# Patient Record
Sex: FEMALE | Race: WHITE | NOT HISPANIC OR LATINO | Employment: FULL TIME | URBAN - METROPOLITAN AREA
[De-identification: names, ages, dates, MRNs, and addresses within clinical notes are randomized per-mention and may not be internally consistent; named-entity substitution may affect disease eponyms.]

---

## 2017-02-28 ENCOUNTER — HISTORICAL (OUTPATIENT)
Dept: INTERNAL MEDICINE | Facility: CLINIC | Age: 52
End: 2017-02-28

## 2017-04-24 ENCOUNTER — HISTORICAL (OUTPATIENT)
Dept: INTERNAL MEDICINE | Facility: CLINIC | Age: 52
End: 2017-04-24

## 2017-06-02 ENCOUNTER — HISTORICAL (OUTPATIENT)
Dept: INTERNAL MEDICINE | Facility: CLINIC | Age: 52
End: 2017-06-02

## 2017-08-11 ENCOUNTER — HISTORICAL (OUTPATIENT)
Dept: RADIOLOGY | Facility: HOSPITAL | Age: 52
End: 2017-08-11

## 2017-09-06 ENCOUNTER — HISTORICAL (OUTPATIENT)
Dept: ADMINISTRATIVE | Facility: HOSPITAL | Age: 52
End: 2017-09-06

## 2017-10-30 ENCOUNTER — HISTORICAL (OUTPATIENT)
Dept: WOUND CARE | Facility: HOSPITAL | Age: 52
End: 2017-10-30

## 2017-10-30 LAB
APPEARANCE, UA: CLEAR
BACTERIA #/AREA URNS AUTO: ABNORMAL /[HPF]
BILIRUB UR QL STRIP: NEGATIVE
COLOR UR: YELLOW
GLUCOSE (UA): NORMAL
HGB UR QL STRIP: 0.03 MG/DL
HYALINE CASTS #/AREA URNS LPF: ABNORMAL /[LPF]
KETONES UR QL STRIP: NEGATIVE
LEUKOCYTE ESTERASE UR QL STRIP: NEGATIVE
NITRITE UR QL STRIP: NEGATIVE
PH UR STRIP: 6 [PH] (ref 4.5–8)
PROT UR QL STRIP: NEGATIVE
RBC #/AREA URNS AUTO: ABNORMAL /[HPF]
SP GR UR STRIP: 1.01 (ref 1–1.03)
SQUAMOUS #/AREA URNS LPF: ABNORMAL /[LPF]
UROBILINOGEN UR STRIP-ACNC: 2 MG/DL
WBC #/AREA URNS AUTO: ABNORMAL /HPF

## 2017-11-01 LAB — FINAL CULTURE: NO GROWTH

## 2017-12-04 ENCOUNTER — HISTORICAL (OUTPATIENT)
Dept: ADMINISTRATIVE | Facility: HOSPITAL | Age: 52
End: 2017-12-04

## 2017-12-04 LAB
APPEARANCE, UA: CLEAR
BACTERIA #/AREA URNS AUTO: ABNORMAL /[HPF]
BILIRUB UR QL STRIP: NEGATIVE
COLOR UR: ABNORMAL
GLUCOSE (UA): NORMAL
HGB UR QL STRIP: 0.06 MG/DL
HYALINE CASTS #/AREA URNS LPF: ABNORMAL /[LPF]
KETONES UR QL STRIP: NEGATIVE
LEUKOCYTE ESTERASE UR QL STRIP: 75 LEU/UL
NITRITE UR QL STRIP: NEGATIVE
PH UR STRIP: 5.5 [PH] (ref 4.5–8)
PROT UR QL STRIP: NEGATIVE
RBC #/AREA URNS AUTO: ABNORMAL /[HPF]
SP GR UR STRIP: 1.01 (ref 1–1.03)
SQUAMOUS #/AREA URNS LPF: ABNORMAL /[LPF]
UROBILINOGEN UR STRIP-ACNC: NORMAL
WBC #/AREA URNS AUTO: ABNORMAL /HPF

## 2017-12-06 LAB — FINAL CULTURE: NO GROWTH

## 2018-01-12 ENCOUNTER — HISTORICAL (OUTPATIENT)
Dept: RADIOLOGY | Facility: HOSPITAL | Age: 53
End: 2018-01-12

## 2018-02-05 LAB
BILIRUB SERPL-MCNC: NEGATIVE MG/DL
BLOOD URINE, POC: NORMAL
CLARITY, POC UA: CLEAR
COLOR, POC UA: COLORLESS
GLUCOSE UR QL STRIP: NEGATIVE
KETONES UR QL STRIP: NEGATIVE
LEUKOCYTE EST, POC UA: NEGATIVE
NITRITE, POC UA: NEGATIVE
PH, POC UA: 5
PROTEIN, POC: NEGATIVE
SPECIFIC GRAVITY, POC UA: 1.01
UROBILINOGEN, POC UA: NORMAL

## 2018-04-12 ENCOUNTER — HISTORICAL (OUTPATIENT)
Dept: INTERNAL MEDICINE | Facility: CLINIC | Age: 53
End: 2018-04-12

## 2018-04-23 ENCOUNTER — HISTORICAL (OUTPATIENT)
Dept: INTERNAL MEDICINE | Facility: CLINIC | Age: 53
End: 2018-04-23

## 2018-04-23 LAB
ABS NEUT (OLG): 3.81 X10(3)/MCL (ref 2.1–9.2)
ALBUMIN SERPL-MCNC: 4.2 GM/DL (ref 3.4–5)
ALBUMIN/GLOB SERPL: 1 RATIO (ref 1–2)
ALP SERPL-CCNC: 59 UNIT/L (ref 45–117)
ALT SERPL-CCNC: 22 UNIT/L (ref 12–78)
AST SERPL-CCNC: 15 UNIT/L (ref 15–37)
BASOPHILS # BLD AUTO: 0.07 X10(3)/MCL
BASOPHILS NFR BLD AUTO: 1 %
BILIRUB SERPL-MCNC: 0.4 MG/DL (ref 0.2–1)
BILIRUBIN DIRECT+TOT PNL SERPL-MCNC: <0.1 MG/DL
BILIRUBIN DIRECT+TOT PNL SERPL-MCNC: ABNORMAL MG/DL
BUN SERPL-MCNC: 18 MG/DL (ref 7–18)
CALCIUM SERPL-MCNC: 9.3 MG/DL (ref 8.5–10.1)
CHLORIDE SERPL-SCNC: 105 MMOL/L (ref 98–107)
CHOLEST SERPL-MCNC: 194 MG/DL
CHOLEST/HDLC SERPL: 2.3 {RATIO} (ref 0–4.4)
CO2 SERPL-SCNC: 30 MMOL/L (ref 21–32)
CREAT SERPL-MCNC: 0.9 MG/DL (ref 0.6–1.3)
DEPRECATED CALCIDIOL+CALCIFEROL SERPL-MC: 16.16 NG/ML (ref 30–80)
EOSINOPHIL # BLD AUTO: 0.23 10*3/UL
EOSINOPHIL NFR BLD AUTO: 3 %
ERYTHROCYTE [DISTWIDTH] IN BLOOD BY AUTOMATED COUNT: 13.1 % (ref 11.5–14.5)
GLOBULIN SER-MCNC: 3.6 GM/ML (ref 2.3–3.5)
GLUCOSE SERPL-MCNC: 88 MG/DL (ref 74–106)
HCT VFR BLD AUTO: 40.9 % (ref 35–46)
HDLC SERPL-MCNC: 83 MG/DL
HGB BLD-MCNC: 13.5 GM/DL (ref 12–16)
IMM GRANULOCYTES # BLD AUTO: 0.05 10*3/UL
IMM GRANULOCYTES NFR BLD AUTO: 1 %
LDLC SERPL CALC-MCNC: 84 MG/DL (ref 0–130)
LYMPHOCYTES # BLD AUTO: 2.86 X10(3)/MCL
LYMPHOCYTES NFR BLD AUTO: 38 % (ref 13–40)
MCH RBC QN AUTO: 29.5 PG (ref 26–34)
MCHC RBC AUTO-ENTMCNC: 33 GM/DL (ref 31–37)
MCV RBC AUTO: 89.5 FL (ref 80–100)
MONOCYTES # BLD AUTO: 0.59 X10(3)/MCL
MONOCYTES NFR BLD AUTO: 8 % (ref 4–12)
NEUTROPHILS # BLD AUTO: 3.81 X10(3)/MCL
NEUTROPHILS NFR BLD AUTO: 50 X10(3)/MCL
PLATELET # BLD AUTO: 285 X10(3)/MCL (ref 130–400)
PMV BLD AUTO: 10.8 FL (ref 7.4–10.4)
POTASSIUM SERPL-SCNC: 3.8 MMOL/L (ref 3.5–5.1)
PROT SERPL-MCNC: 7.8 GM/DL (ref 6.4–8.2)
RBC # BLD AUTO: 4.57 X10(6)/MCL (ref 4–5.2)
SODIUM SERPL-SCNC: 140 MMOL/L (ref 136–145)
TRIGL SERPL-MCNC: 135 MG/DL
VLDLC SERPL CALC-MCNC: 27 MG/DL
WBC # SPEC AUTO: 7.6 X10(3)/MCL (ref 4.5–11)

## 2018-08-24 ENCOUNTER — HISTORICAL (OUTPATIENT)
Dept: RADIOLOGY | Facility: HOSPITAL | Age: 53
End: 2018-08-24

## 2018-11-15 ENCOUNTER — HISTORICAL (OUTPATIENT)
Dept: ADMINISTRATIVE | Facility: HOSPITAL | Age: 53
End: 2018-11-15

## 2018-11-15 LAB — DEPRECATED CALCIDIOL+CALCIFEROL SERPL-MC: 29.48 NG/ML (ref 30–80)

## 2019-01-07 ENCOUNTER — HISTORICAL (OUTPATIENT)
Dept: ADMINISTRATIVE | Facility: HOSPITAL | Age: 54
End: 2019-01-07

## 2019-01-07 LAB
APPEARANCE, UA: CLEAR
BACTERIA #/AREA URNS AUTO: ABNORMAL /[HPF]
BILIRUB UR QL STRIP: NEGATIVE
COLOR UR: NORMAL
GLUCOSE (UA): NORMAL
HGB UR QL STRIP: 0.1 MG/DL
HYALINE CASTS #/AREA URNS LPF: ABNORMAL /[LPF]
KETONES UR QL STRIP: NEGATIVE
LEUKOCYTE ESTERASE UR QL STRIP: NEGATIVE
NITRITE UR QL STRIP: NEGATIVE
PH UR STRIP: 6.5 [PH] (ref 4.5–8)
PROT UR QL STRIP: NEGATIVE
RBC #/AREA URNS AUTO: ABNORMAL /[HPF]
SP GR UR STRIP: 1.01 (ref 1–1.03)
SQUAMOUS #/AREA URNS LPF: ABNORMAL /[LPF]
UROBILINOGEN UR STRIP-ACNC: NORMAL
WBC #/AREA URNS AUTO: ABNORMAL /HPF

## 2019-01-29 ENCOUNTER — HISTORICAL (OUTPATIENT)
Dept: RADIOLOGY | Facility: HOSPITAL | Age: 54
End: 2019-01-29

## 2019-07-02 ENCOUNTER — HISTORICAL (OUTPATIENT)
Dept: ADMINISTRATIVE | Facility: HOSPITAL | Age: 54
End: 2019-07-02

## 2019-09-24 ENCOUNTER — HISTORICAL (OUTPATIENT)
Dept: ADMINISTRATIVE | Facility: HOSPITAL | Age: 54
End: 2019-09-24

## 2019-10-14 ENCOUNTER — HISTORICAL (OUTPATIENT)
Dept: RADIOLOGY | Facility: HOSPITAL | Age: 54
End: 2019-10-14

## 2019-12-23 ENCOUNTER — HISTORICAL (OUTPATIENT)
Dept: INTERNAL MEDICINE | Facility: CLINIC | Age: 54
End: 2019-12-23

## 2019-12-23 LAB
ABS NEUT (OLG): 3.19 X10(3)/MCL (ref 2.1–9.2)
ALBUMIN SERPL-MCNC: 4.4 GM/DL (ref 3.4–5)
ALBUMIN/GLOB SERPL: 1.2 RATIO (ref 1.1–2)
ALP SERPL-CCNC: 58 UNIT/L (ref 45–117)
ALT SERPL-CCNC: 28 UNIT/L (ref 12–78)
AST SERPL-CCNC: 20 UNIT/L (ref 15–37)
BASOPHILS # BLD AUTO: 0.1 X10(3)/MCL (ref 0–0.2)
BASOPHILS NFR BLD AUTO: 2 %
BILIRUB SERPL-MCNC: 0.7 MG/DL (ref 0.2–1)
BILIRUBIN DIRECT+TOT PNL SERPL-MCNC: 0.2 MG/DL (ref 0–0.2)
BILIRUBIN DIRECT+TOT PNL SERPL-MCNC: 0.5 MG/DL
BUN SERPL-MCNC: 18 MG/DL (ref 7–18)
CALCIUM SERPL-MCNC: 9.6 MG/DL (ref 8.5–10.1)
CHLORIDE SERPL-SCNC: 106 MMOL/L (ref 98–107)
CHOLEST SERPL-MCNC: 232 MG/DL
CHOLEST/HDLC SERPL: 2.6 {RATIO} (ref 0–4.4)
CO2 SERPL-SCNC: 30 MMOL/L (ref 21–32)
CREAT SERPL-MCNC: 0.8 MG/DL (ref 0.6–1.3)
EOSINOPHIL # BLD AUTO: 0.2 X10(3)/MCL (ref 0–0.9)
EOSINOPHIL NFR BLD AUTO: 3 %
ERYTHROCYTE [DISTWIDTH] IN BLOOD BY AUTOMATED COUNT: 13.3 % (ref 11.5–14.5)
GLOBULIN SER-MCNC: 3.6 GM/ML (ref 2.3–3.5)
GLUCOSE SERPL-MCNC: 93 MG/DL (ref 74–106)
HCT VFR BLD AUTO: 43.4 % (ref 35–46)
HDLC SERPL-MCNC: 90 MG/DL (ref 40–59)
HGB BLD-MCNC: 13.9 GM/DL (ref 12–16)
IMM GRANULOCYTES # BLD AUTO: 0.06 10*3/UL
IMM GRANULOCYTES NFR BLD AUTO: 1 %
LDLC SERPL CALC-MCNC: 118 MG/DL
LYMPHOCYTES # BLD AUTO: 2.2 X10(3)/MCL (ref 0.6–4.6)
LYMPHOCYTES NFR BLD AUTO: 35 %
MCH RBC QN AUTO: 29.3 PG (ref 26–34)
MCHC RBC AUTO-ENTMCNC: 32 GM/DL (ref 31–37)
MCV RBC AUTO: 91.4 FL (ref 80–100)
MONOCYTES # BLD AUTO: 0.5 X10(3)/MCL (ref 0.1–1.3)
MONOCYTES NFR BLD AUTO: 8 %
NEUTROPHILS # BLD AUTO: 3.19 X10(3)/MCL (ref 2.1–9.2)
NEUTROPHILS NFR BLD AUTO: 51 %
PLATELET # BLD AUTO: 310 X10(3)/MCL (ref 130–400)
PMV BLD AUTO: 10.9 FL (ref 7.4–10.4)
POTASSIUM SERPL-SCNC: 4.2 MMOL/L (ref 3.5–5.1)
PROT SERPL-MCNC: 8 GM/DL (ref 6.4–8.2)
RBC # BLD AUTO: 4.75 X10(6)/MCL (ref 4–5.2)
SODIUM SERPL-SCNC: 140 MMOL/L (ref 136–145)
TRIGL SERPL-MCNC: 119 MG/DL
VLDLC SERPL CALC-MCNC: 24 MG/DL
WBC # SPEC AUTO: 6.2 X10(3)/MCL (ref 4.5–11)

## 2020-01-13 ENCOUNTER — HISTORICAL (OUTPATIENT)
Dept: RADIOLOGY | Facility: HOSPITAL | Age: 55
End: 2020-01-13

## 2020-03-09 ENCOUNTER — HISTORICAL (OUTPATIENT)
Dept: ADMINISTRATIVE | Facility: HOSPITAL | Age: 55
End: 2020-03-09

## 2020-03-09 LAB
APPEARANCE, UA: CLEAR
BACTERIA #/AREA URNS AUTO: ABNORMAL /HPF
BILIRUB UR QL STRIP: NEGATIVE
COLOR UR: NORMAL
GLUCOSE (UA): NEGATIVE
HGB UR QL STRIP: 0.1
HYALINE CASTS #/AREA URNS LPF: ABNORMAL /LPF
KETONES UR QL STRIP: NEGATIVE
LEUKOCYTE ESTERASE UR QL STRIP: NEGATIVE
NITRITE UR QL STRIP: NEGATIVE
PH UR STRIP: 6 [PH] (ref 4.5–8)
PROT UR QL STRIP: NEGATIVE
RBC #/AREA URNS AUTO: ABNORMAL /HPF
SP GR UR STRIP: 1.01 (ref 1–1.03)
SQUAMOUS #/AREA URNS LPF: ABNORMAL /LPF
UROBILINOGEN UR STRIP-ACNC: NORMAL
WBC #/AREA URNS AUTO: ABNORMAL /HPF

## 2020-05-27 ENCOUNTER — HISTORICAL (OUTPATIENT)
Dept: ANESTHESIOLOGY | Facility: HOSPITAL | Age: 55
End: 2020-05-27

## 2020-05-27 LAB — SARS-COV-2 RNA RESP QL NAA+PROBE: NOT DETECTED

## 2020-06-09 ENCOUNTER — HISTORICAL (OUTPATIENT)
Dept: RADIOLOGY | Facility: HOSPITAL | Age: 55
End: 2020-06-09

## 2020-07-14 ENCOUNTER — HISTORICAL (OUTPATIENT)
Dept: RADIOLOGY | Facility: HOSPITAL | Age: 55
End: 2020-07-14

## 2020-07-29 ENCOUNTER — HISTORICAL (OUTPATIENT)
Dept: INTERNAL MEDICINE | Facility: CLINIC | Age: 55
End: 2020-07-29

## 2020-07-29 LAB
ABS NEUT (OLG): 4.25 X10(3)/MCL (ref 2.1–9.2)
ALBUMIN SERPL-MCNC: 4.1 GM/DL (ref 3.4–5)
ALBUMIN/GLOB SERPL: 1.1 RATIO (ref 1.1–2)
ALP SERPL-CCNC: 66 UNIT/L (ref 45–117)
ALT SERPL-CCNC: 20 UNIT/L (ref 12–78)
APPEARANCE, UA: CLEAR
AST SERPL-CCNC: 16 UNIT/L (ref 15–37)
BACTERIA #/AREA URNS AUTO: ABNORMAL /HPF
BASOPHILS # BLD AUTO: 0.1 X10(3)/MCL (ref 0–0.2)
BASOPHILS NFR BLD AUTO: 1 %
BILIRUB SERPL-MCNC: 0.4 MG/DL (ref 0.2–1)
BILIRUB UR QL STRIP: NEGATIVE
BILIRUBIN DIRECT+TOT PNL SERPL-MCNC: 0.1 MG/DL (ref 0–0.2)
BILIRUBIN DIRECT+TOT PNL SERPL-MCNC: 0.3 MG/DL
BUN SERPL-MCNC: 14 MG/DL (ref 7–18)
CALCIUM SERPL-MCNC: 9.6 MG/DL (ref 8.5–10.1)
CHLORIDE SERPL-SCNC: 103 MMOL/L (ref 98–107)
CHOLEST SERPL-MCNC: 223 MG/DL
CHOLEST/HDLC SERPL: 3.1 {RATIO} (ref 0–4.4)
CO2 SERPL-SCNC: 30 MMOL/L (ref 21–32)
COLOR UR: ABNORMAL
CREAT SERPL-MCNC: 0.8 MG/DL (ref 0.6–1.3)
EOSINOPHIL # BLD AUTO: 0.2 X10(3)/MCL (ref 0–0.9)
EOSINOPHIL NFR BLD AUTO: 2 %
ERYTHROCYTE [DISTWIDTH] IN BLOOD BY AUTOMATED COUNT: 13.4 % (ref 11.5–14.5)
GLOBULIN SER-MCNC: 3.9 GM/ML (ref 2.3–3.5)
GLUCOSE (UA): NEGATIVE
GLUCOSE SERPL-MCNC: 81 MG/DL (ref 74–106)
HCT VFR BLD AUTO: 43.3 % (ref 35–46)
HDLC SERPL-MCNC: 71 MG/DL (ref 40–59)
HGB BLD-MCNC: 14 GM/DL (ref 12–16)
HGB UR QL STRIP: 0.03 MG/DL
HYALINE CASTS #/AREA URNS LPF: ABNORMAL /LPF
IMM GRANULOCYTES # BLD AUTO: 0.07 10*3/UL
IMM GRANULOCYTES NFR BLD AUTO: 1 %
KETONES UR QL STRIP: NEGATIVE
LDLC SERPL CALC-MCNC: 105 MG/DL
LEUKOCYTE ESTERASE UR QL STRIP: NEGATIVE
LYMPHOCYTES # BLD AUTO: 2.1 X10(3)/MCL (ref 0.6–4.6)
LYMPHOCYTES NFR BLD AUTO: 30 %
MCH RBC QN AUTO: 28.9 PG (ref 26–34)
MCHC RBC AUTO-ENTMCNC: 32.3 GM/DL (ref 31–37)
MCV RBC AUTO: 89.3 FL (ref 80–100)
MONOCYTES # BLD AUTO: 0.5 X10(3)/MCL (ref 0.1–1.3)
MONOCYTES NFR BLD AUTO: 8 %
NEUTROPHILS # BLD AUTO: 4.25 X10(3)/MCL (ref 2.1–9.2)
NEUTROPHILS NFR BLD AUTO: 59 %
NITRITE UR QL STRIP: NEGATIVE
PH UR STRIP: 5.5 [PH] (ref 4.5–8)
PLATELET # BLD AUTO: 276 X10(3)/MCL (ref 130–400)
PMV BLD AUTO: 11.1 FL (ref 7.4–10.4)
POTASSIUM SERPL-SCNC: 4.6 MMOL/L (ref 3.5–5.1)
PROT SERPL-MCNC: 8 GM/DL (ref 6.4–8.2)
PROT UR QL STRIP: NEGATIVE
RBC # BLD AUTO: 4.85 X10(6)/MCL (ref 4–5.2)
RBC #/AREA URNS AUTO: ABNORMAL /HPF
SODIUM SERPL-SCNC: 137 MMOL/L (ref 136–145)
SP GR UR STRIP: 1 (ref 1–1.03)
SQUAMOUS #/AREA URNS LPF: ABNORMAL /LPF
TRIGL SERPL-MCNC: 234 MG/DL
TSH SERPL-ACNC: 1.85 MIU/L (ref 0.36–3.74)
UROBILINOGEN UR STRIP-ACNC: NORMAL
VLDLC SERPL CALC-MCNC: 47 MG/DL
WBC # SPEC AUTO: 7.2 X10(3)/MCL (ref 4.5–11)
WBC #/AREA URNS AUTO: ABNORMAL /HPF

## 2020-08-12 ENCOUNTER — HISTORICAL (OUTPATIENT)
Dept: RADIOLOGY | Facility: HOSPITAL | Age: 55
End: 2020-08-12

## 2020-09-29 ENCOUNTER — HISTORICAL (OUTPATIENT)
Dept: RADIOLOGY | Facility: HOSPITAL | Age: 55
End: 2020-09-29

## 2020-10-30 ENCOUNTER — HISTORICAL (OUTPATIENT)
Dept: RADIOLOGY | Facility: HOSPITAL | Age: 55
End: 2020-10-30

## 2020-11-10 ENCOUNTER — HISTORICAL (OUTPATIENT)
Dept: RADIOLOGY | Facility: HOSPITAL | Age: 55
End: 2020-11-10

## 2020-12-11 ENCOUNTER — HISTORICAL (OUTPATIENT)
Dept: ADMINISTRATIVE | Facility: HOSPITAL | Age: 55
End: 2020-12-11

## 2021-01-13 ENCOUNTER — HISTORICAL (OUTPATIENT)
Dept: RADIOLOGY | Facility: HOSPITAL | Age: 56
End: 2021-01-13

## 2021-03-31 ENCOUNTER — HISTORICAL (OUTPATIENT)
Dept: CARDIOLOGY | Facility: CLINIC | Age: 56
End: 2021-03-31

## 2021-03-31 LAB
ALBUMIN SERPL-MCNC: 4.1 GM/DL (ref 3.5–5)
ALBUMIN/GLOB SERPL: 1.2 RATIO (ref 1.1–2)
ALP SERPL-CCNC: 58 UNIT/L (ref 40–150)
ALT SERPL-CCNC: 15 UNIT/L (ref 0–55)
AST SERPL-CCNC: 19 UNIT/L (ref 5–34)
BILIRUB SERPL-MCNC: 0.6 MG/DL
BILIRUBIN DIRECT+TOT PNL SERPL-MCNC: 0.2 MG/DL (ref 0–0.5)
BILIRUBIN DIRECT+TOT PNL SERPL-MCNC: 0.4 MG/DL (ref 0–0.8)
BUN SERPL-MCNC: 14.5 MG/DL (ref 9.8–20.1)
CALCIUM SERPL-MCNC: 9.7 MG/DL (ref 8.4–10.2)
CHLORIDE SERPL-SCNC: 103 MMOL/L (ref 98–107)
CHOLEST SERPL-MCNC: 194 MG/DL
CHOLEST/HDLC SERPL: 3 {RATIO} (ref 0–5)
CO2 SERPL-SCNC: 30 MMOL/L (ref 22–29)
CREAT SERPL-MCNC: 0.89 MG/DL (ref 0.55–1.02)
GLOBULIN SER-MCNC: 3.3 GM/DL (ref 2.4–3.5)
GLUCOSE SERPL-MCNC: 93 MG/DL (ref 74–100)
HDLC SERPL-MCNC: 66 MG/DL (ref 35–60)
LDLC SERPL CALC-MCNC: 104 MG/DL (ref 50–140)
POTASSIUM SERPL-SCNC: 4.9 MMOL/L (ref 3.5–5.1)
PROT SERPL-MCNC: 7.4 GM/DL (ref 6.4–8.3)
SODIUM SERPL-SCNC: 140 MMOL/L (ref 136–145)
TRIGL SERPL-MCNC: 119 MG/DL (ref 37–140)
VLDLC SERPL CALC-MCNC: 24 MG/DL

## 2021-05-05 ENCOUNTER — HISTORICAL (OUTPATIENT)
Dept: RADIOLOGY | Facility: HOSPITAL | Age: 56
End: 2021-05-05

## 2021-09-07 ENCOUNTER — HISTORICAL (OUTPATIENT)
Dept: RADIOLOGY | Facility: HOSPITAL | Age: 56
End: 2021-09-07

## 2021-09-08 ENCOUNTER — HISTORICAL (OUTPATIENT)
Dept: LAB | Facility: HOSPITAL | Age: 56
End: 2021-09-08

## 2021-09-08 LAB — SARS-COV-2 AG RESP QL IA.RAPID: NEGATIVE

## 2021-09-10 ENCOUNTER — HISTORICAL (OUTPATIENT)
Dept: MEDSURG UNIT | Facility: HOSPITAL | Age: 56
End: 2021-09-10

## 2021-10-04 ENCOUNTER — HISTORICAL (OUTPATIENT)
Dept: CARDIOLOGY | Facility: CLINIC | Age: 56
End: 2021-10-04

## 2021-10-04 LAB
ALBUMIN SERPL-MCNC: 4.2 GM/DL (ref 3.5–5)
ALBUMIN/GLOB SERPL: 1.2 RATIO (ref 1.1–2)
ALP SERPL-CCNC: 55 UNIT/L (ref 40–150)
ALT SERPL-CCNC: 13 UNIT/L (ref 0–55)
AST SERPL-CCNC: 20 UNIT/L (ref 5–34)
BILIRUB SERPL-MCNC: 0.4 MG/DL
BILIRUBIN DIRECT+TOT PNL SERPL-MCNC: 0.2 MG/DL (ref 0–0.5)
BILIRUBIN DIRECT+TOT PNL SERPL-MCNC: 0.2 MG/DL (ref 0–0.8)
BUN SERPL-MCNC: 17.7 MG/DL (ref 9.8–20.1)
CALCIUM SERPL-MCNC: 10.2 MG/DL (ref 8.4–10.2)
CHLORIDE SERPL-SCNC: 106 MMOL/L (ref 98–107)
CHOLEST SERPL-MCNC: 211 MG/DL
CHOLEST/HDLC SERPL: 3 {RATIO} (ref 0–5)
CO2 SERPL-SCNC: 28 MMOL/L (ref 22–29)
CREAT SERPL-MCNC: 0.87 MG/DL (ref 0.55–1.02)
GLOBULIN SER-MCNC: 3.4 GM/DL (ref 2.4–3.5)
GLUCOSE SERPL-MCNC: 95 MG/DL (ref 74–100)
HDLC SERPL-MCNC: 67 MG/DL (ref 35–60)
LDLC SERPL CALC-MCNC: 124 MG/DL (ref 50–140)
POTASSIUM SERPL-SCNC: 5 MMOL/L (ref 3.5–5.1)
PROT SERPL-MCNC: 7.6 GM/DL (ref 6.4–8.3)
SODIUM SERPL-SCNC: 140 MMOL/L (ref 136–145)
TRIGL SERPL-MCNC: 100 MG/DL (ref 37–140)
VLDLC SERPL CALC-MCNC: 20 MG/DL

## 2021-10-21 ENCOUNTER — HISTORICAL (OUTPATIENT)
Dept: RADIOLOGY | Facility: HOSPITAL | Age: 56
End: 2021-10-21

## 2021-11-02 ENCOUNTER — HISTORICAL (OUTPATIENT)
Dept: RADIOLOGY | Facility: HOSPITAL | Age: 56
End: 2021-11-02

## 2021-11-08 ENCOUNTER — HISTORICAL (OUTPATIENT)
Dept: RADIOLOGY | Facility: HOSPITAL | Age: 56
End: 2021-11-08

## 2022-02-01 ENCOUNTER — HISTORICAL (OUTPATIENT)
Dept: ADMINISTRATIVE | Facility: HOSPITAL | Age: 57
End: 2022-02-01

## 2022-02-01 ENCOUNTER — HISTORICAL (OUTPATIENT)
Dept: RADIOLOGY | Facility: HOSPITAL | Age: 57
End: 2022-02-01

## 2022-03-14 ENCOUNTER — HISTORICAL (OUTPATIENT)
Dept: LAB | Facility: HOSPITAL | Age: 57
End: 2022-03-14

## 2022-03-14 LAB
ABS NEUT (OLG): 3.84
ALBUMIN SERPL-MCNC: 4 G/DL (ref 3.5–5)
ALBUMIN/GLOB SERPL: 1.1 {RATIO} (ref 1.1–2)
ALP SERPL-CCNC: 73 U/L (ref 40–150)
ALT SERPL-CCNC: 16 U/L (ref 0–55)
AST SERPL-CCNC: 21 U/L (ref 5–34)
BASOPHILS # BLD AUTO: 0.08 10*3/UL
BASOPHILS NFR BLD AUTO: 1.1 %
BILIRUB SERPL-MCNC: 0.3 MG/DL
BILIRUBIN DIRECT+TOT PNL SERPL-MCNC: 0.1 (ref 0–0.5)
BILIRUBIN DIRECT+TOT PNL SERPL-MCNC: 0.2
BUN SERPL-MCNC: 17 MG/DL (ref 9.8–20.1)
CALCIUM SERPL-MCNC: 9.8 MG/DL (ref 8.7–10.5)
CHLORIDE SERPL-SCNC: 102 MMOL/L (ref 98–107)
CO2 SERPL-SCNC: 25 MMOL/L (ref 22–29)
CREAT SERPL-MCNC: 0.84 MG/DL (ref 0.55–1.02)
EOSINOPHIL # BLD AUTO: 0.28 10*3/UL
EOSINOPHIL NFR BLD AUTO: 3.7 %
ERYTHROCYTE [DISTWIDTH] IN BLOOD BY AUTOMATED COUNT: 14 %
GLOBULIN SER-MCNC: 3.6 G/DL (ref 2.4–3.5)
GLUCOSE SERPL-MCNC: 60 MG/DL (ref 74–100)
HCT VFR BLD AUTO: 42.6 % (ref 34–46)
HEMOLYSIS INTERF INDEX SERPL-ACNC: 7
HGB BLD-MCNC: 14 G/DL (ref 11.3–15.4)
ICTERIC INTERF INDEX SERPL-ACNC: 0
IMM GRANULOCYTES # BLD AUTO: 0.05 10*3/UL (ref 0–0.1)
IMM GRANULOCYTES NFR BLD AUTO: 0.7 % (ref 0–1)
LIPEMIC INTERF INDEX SERPL-ACNC: 3
LYMPHOCYTES # BLD AUTO: 2.56 10*3/UL
LYMPHOCYTES NFR BLD AUTO: 34.2 %
MANUAL DIFF? (OHS): NO
MCH RBC QN AUTO: 28.9 PG (ref 27–33)
MCHC RBC AUTO-ENTMCNC: 32.9 G/DL (ref 32–35)
MCV RBC AUTO: 88 FL (ref 81–97)
MONOCYTES # BLD AUTO: 0.68 10*3/UL
MONOCYTES NFR BLD AUTO: 9.1 %
NEUTROPHILS # BLD AUTO: 3.84 10*3/UL
NEUTROPHILS NFR BLD AUTO: 51.2 %
PLATELET # BLD AUTO: 291 10*3/UL (ref 140–450)
PMV BLD AUTO: 11 FL
POTASSIUM SERPL-SCNC: 4.1 MMOL/L (ref 3.5–5.1)
PROT SERPL-MCNC: 7.6 G/DL (ref 6.4–8.3)
RBC # BLD AUTO: 4.84 10*6/UL (ref 3.9–5)
SARS-COV-2 AG RESP QL IA.RAPID: NEGATIVE
SODIUM SERPL-SCNC: 140 MMOL/L (ref 136–145)
WBC # SPEC AUTO: 7.49 10*3/UL (ref 3.4–9.2)

## 2022-03-16 ENCOUNTER — HISTORICAL (OUTPATIENT)
Dept: MEDSURG UNIT | Facility: HOSPITAL | Age: 57
End: 2022-03-16

## 2022-04-07 ENCOUNTER — HISTORICAL (OUTPATIENT)
Dept: ADMINISTRATIVE | Facility: HOSPITAL | Age: 57
End: 2022-04-07
Payer: MEDICAID

## 2022-04-24 VITALS
HEIGHT: 63 IN | SYSTOLIC BLOOD PRESSURE: 98 MMHG | OXYGEN SATURATION: 99 % | BODY MASS INDEX: 27.23 KG/M2 | WEIGHT: 153.69 LBS | DIASTOLIC BLOOD PRESSURE: 66 MMHG

## 2022-04-29 NOTE — PROGRESS NOTES
"   Patient:   Rosa Ramsey            MRN: [id number]            FIN: [id number]               Age:   51 years     Sex:  Female     :  1965   Associated Diagnoses:   Primary osteoarthritis of right knee; Tobacco abuse counseling   Author:   Paul Epps NP      Chief Complaint   2017 8:18 CDT        Referral for R knee arthritis/pain        History of Present Illness   52 yo  female presents to ortho clinic for c/o right knee pain.  Reports that the pain to right knee started "over a year ago" but denies any known injury or trauma.  Reports that standing or walking for long periods of time makes the pain worse.  Denies ever receiving knee injections for symptom relief.  Denies any numbness/tingling to right LE.  Admits taking prescribed medication as needed for pain with moderate relief.  Denies using any assistive devices for ambulation assistance.  Current daily smoker.  No other complaints today.      Review of Systems   ROS reviewed as documented in chart      Health Status   Allergies:    Allergic Reactions (Selected)  Severity Not Documented  Demerol HCl- No reactions were documented.,    Allergies (1) Active Reaction  Demerol HCl None Documented     Current medications:  (Selected)   Outpatient Medications  Ordered  Heparin Flush 100 U/mL - 5 mL: 500 units, form: Injection, IV Push, One Time, first dose 10/18/16 7:44:00 CDT, stop date 10/18/16 7:44:00 CDT, flush with 3cc's prior to D/C, 1,,837  Kenalog-40 injectable suspension: 40 mg, form: Injection, Intra-Articular, Once, first dose 17 10:00:00 CDT, stop date 17 10:00:00 CDT, 24  Lidocaine 1% 10ml inj: 5 mL, form: Soln, Intra-Articular, Once, first dose 17 9:04:00 CDT, stop date 17 9:04:00 CDT  Phenergan: 25 mg, form: Injection, IM, Once, first dose 10/18/16 8:00:00 CDT, stop date 10/18/16 8:00:00 CDT, Give with first dose of IV Morphine Sulfate., 1,023,851  Racemic Epinephrine: 11.25 mg, 3 mL, 2.25 " % =, form: Soln-Inh, NEB, Once, Order duration: 1 dose(s), first dose 10/18/16 8:00:00 CDT, stop date 10/18/16 8:00:00 CDT  albuterol 2.5 mg/3 mL (0.083%) inhalation solution: 2.5 mg, form: Soln-Inh, NEB, Once, Order duration: 1 dose(s), first dose 10/18/16 8:00:00 CDT, stop date 10/18/16 8:00:00 CDT, 1,023,851  Prescriptions  Prescribed  Elmiron 100 mg oral capsule: 100 mg = 1 cap(s), Oral, TIDAC, # 90 cap(s), 3 Refill(s)  Mobic 7.5 mg oral tablet: 7.5 mg = 1 tab(s), Oral, BID, Take with food. DO NOT take with any other NSAID., # 60 tab(s), 1 Refill(s), Pharmacy: [facility]  Protonix 40 mg ORAL enteric coated tablet: 40 mg = 1 tab(s), Oral, Daily, # 30 tab(s), 0 Refill(s)  Voltaren 1% topical gel: 2 gm =, TOP, QID, not to exceed 16 grams/day/single joint of lower extremities, # 100 gm, 3 Refill(s), Pharmacy: [facility]  alendronate 70 mg oral tablet: 70 mg, Oral, qWeek, with 6 to 8 ounces plain water, at least 30 minutes before first food, beverage, or medication of the day, # 4 tab(s), 5 Refill(s), Pharmacy: [facility]  amitriptyline 25 mg oral tablet: 25 mg = 1 tab(s), Oral, Once a day (at bedtime), # 30 tab(s), 5 Refill(s), Pharmacy: [facility]  tolterodine 2 mg oral tablet: 2 mg = 1 tab(s), Oral, BID, # 60 tab(s), 2 Refill(s), Pharmacy: [facility],    Home Medications (7) Active  alendronate 70 mg oral tablet 70 mg, Oral, qWeek  amitriptyline 25 mg oral tablet 25 mg = 1 tab(s), Oral, Once a day (at bedtime)  Elmiron 100 mg oral capsule 100 mg = 1 cap(s), Oral, TIDAC  Mobic 7.5 mg oral tablet 7.5 mg = 1 tab(s), Oral, BID  Protonix 40 mg ORAL enteric coated tablet 40 mg = 1 tab(s), Oral, Daily  tolterodine 2 mg oral tablet 2 mg = 1 tab(s), Oral, BID  Voltaren 1% topical gel 2 gm, TOP, QID  ,    Medications (2) Active  Scheduled: (2)  lidocaine 1% MDV- 10 mL (per 10mg)  5 mL, Intra-Articular, Once  triamcinolone acetonide 40 mg/ml Inj  40 mg 1 mL,  Intra-Articular, Once  Continuous: (0)  PRN: (0)     Problem list:    All Problems  Acid reflux / SNOMED CT [id number] / Confirmed  Cystitis / SNOMED CT [id number] / Confirmed  Cystitis / SNOMED CT [id number] / Confirmed  Neuropathy of lower limb / SNOMED CT 8896850141 / Confirmed  Tobacco user / SNOMED CT 748813936 / Probable  Tobacco user / SNOMED CT 018401467 / Probable  Resolved: Fracture / SNOMED CT 609996876  Canceled: Tobacco user / SNOMED CT 814250589,    Active Problems (6)  Acid reflux   Cystitis   Cystitis   Neuropathy of lower limb   Tobacco user   Tobacco user         Histories   Past Medical History:    Active  Cystitis ([id number])  Resolved  Fracture ([id number]):  Resolved.   Family History:    Cancer  Father  Hypertension.  Mother    Father  Mother     Procedure history:    Cystoscopy (None) on 10/18/2016 at 51 Years.  Comments:  10/18/2016 07:50 - Lennie Javed RN  auto-populated from documented surgical case  Cystoscopy ([id number]).  partial hysterectomy.   Social History        Social & Psychosocial Habits    Alcohol  2014 Risk Assessment: Denies Alcohol Use    2016  Use: Never    Employment/School  2016  Status: Employed    Description: CNA    Activity level: Occasional physical work    Highest education: Some college    Exercise    Comment: DOES NOT - 2017 08:12 - Pily Mc LPN.    Home/Environment  2016  Lives with: Children    Living situation: Home/Independent    Alcohol abuse in household: No    Substance abuse in household: No    Smoker in household: No    Feels unsafe at home: No    Safe place to go: Yes    Family/Friends available to help: Yes    Concern for family members at home: Yes    Major illness in household: Yes    Nutrition/Health  2017  Type of diet: IC Diet    Caffeine intake amount: COFFEE    Wants to lose weight: Yes     Sleeping concerns: No    Feels highly stressed: Yes    Sexual    Comment: PRIVATE - 05/19/2017 08:13 - Pily Mc LPN    Substance Abuse  09/02/2014 Risk Assessment: Denies Substance Abuse    04/07/2016  Use: Never    Tobacco  03/13/2015 Risk Assessment: High Risk    09/06/2017  Use: Current every day smoker    Type: Cigarettes    Tobacco use per day: 2    Started at age: 20.0 Years    Previous treatment: None    Ready to change: Yes    Comment: 1-2 cig per night - 04/07/2016 10:06 - Bonifacio HUSSEIN, Elis Nathan        Physical Examination   Measurements from flowsheet : Measurements   9/6/2017 8:18 CDT        Weight Dosing             66.1 kg                             Weight Measured           66.1 kg                             Weight Measured and Calculated in Lbs     145.72 lb                             Height/Length Dosing      161.29 cm                             Height/Length Measured    161.29 cm                             BSA Measured              1.72 m2                             Body Mass Index Measured  25.41 kg/m2     General:  Alert and oriented.    HENT:  Normocephalic.    Neck:  Supple.    Integumentary:  Warm, Dry, Pink.    Neurologic:  No focal deficits.    Cognition and Speech:  Speech clear and coherent.    Psychiatric:  Appropriate mood & affect.    Right knee exam:  Good ROM with pain/crepitis associated with movement.  TTP to medial and lateral joint lines.  NV intact.  No effusion noted.  Extension = 0.  Flexion = 125.  Mild quad deconditioning noted.      Health Maintenance      Health Maintenance     Pending (in the next year)        Due            Cervical Cancer Screening due  09/06/17  and every 5  year(s)           Tetanus Vaccine due  09/06/17  and every 10  year(s)        Postponed            Alcohol Misuse Screening due  07/12/18  and every 1  year(s)        Due In Future            Influenza Vaccine not due until  10/02/17  and every 1  year(s)           HIV Screening not  due until  02/28/18  and every 1  year(s)           Lipid Screening not due until  02/28/18  and every 1  year(s)           Blood Pressure Screening not due until  08/28/18  and every 1  year(s)           Depression Screening not due until  08/28/18  and every 1  year(s)     Satisfied (in the past 1 year)        Satisfied            Blood Pressure Screening on  08/28/17.  Satisfied by Rosa Roger LPN           Body Mass Index Check on  09/06/17.  Satisfied by Milligan RN, Desi R           Breast Cancer Screening on  04/24/17.  Satisfied by Mariposa Marie           Depression Screening on  08/28/17.  Satisfied by Rosa Roger LPN           Diabetes Screening on  04/05/17.  Satisfied by Yaw Barnes           HIV Screening on  02/28/17.  Satisfied by Meenakshi Madera           Lipid Screening on  02/28/17.  Satisfied by Sally Joy           Obesity Screening on  09/06/17.  Satisfied by Milligan RN, Desi R           Tobacco Use Screening on  09/06/17.  Satisfied by Milligan RN, Desi R        Postponed            Alcohol Misuse Screening until  07/12/17.  Recorded for Fernanda Briceno LPN  Reason: Temporary contraindication - reschedule          Procedure   Joint aspiration/ injection procedure   Date/ Time:  9/6/2017 09:17:00.     Confirmed: patient, procedure, side, site, safety procedures followed.     Performed by: self.     Informed consent: signed by patient.     Indication: symptomatic relief.     Location: the right knee.     Preparation and technique: skin prep alcohol/iodophor (Dura Prep), sterile preparation of site in usual fashion.     Joint injected: with  Lidocaine 1% 5mL and Kenalog 40mg 1mL.     Procedure tolerated: well.        Review / Management   Results review:     No qualifying data available.      Right knee x-rays (9/6/17):     Radiology Report     History.  Knee pain. No injury history.     Reference.  8 November 2016.     Findings.  4 views of the right knee demonstrate  no acute fracture or  dislocation. Degenerative changes with osteophyte formation and mild  joint space narrowing in the medial compartment. No joint effusion  seen.     Impression.  Osteoarthritic changes primarily involve the medial compartment.         Impression and Plan   Diagnosis     Primary osteoarthritis of right knee (GUR74-BU M17.11).     Tobacco abuse counseling (JYF03-QX Z71.6).       Plan:  1.  Right knee injection.  2.  ROM/strengthening exercises to right LE (printed instructions given).  3.  Continue prescribed medication as needed for pain relief.  4.  Ice compresses and activity modifications as needed for symptom relief.  5.  Discussed risks of smoking and pt. does not want to quit at this time.  6.  RTC PRN.   Patient Instructions:  Osteoarthritis, Knee Injection, Care After, Steps to Quit Smoking.

## 2022-04-29 NOTE — PROGRESS NOTES
Patient:   Rosa Ramsey            MRN: [id number]            FIN: [id number]               Age:   52 years     Sex:  Female     :  1965   Associated Diagnoses:   None   Author:   Se MCMILLAN, Amira García reviewed: Will discuss with patient during follow-up appointment on May 8, 2018 at 2:20 PM.

## 2022-04-29 NOTE — OP NOTE
Patient:   Rosa Ramsey            MRN: [id number]            FIN: [id number]               Age:   55 years     Sex:  Female     :  1965   Associated Diagnoses:   Chronic hoarseness   Author:   Christine Sauer MD      Operative Note   Operative Information   Date/ Time:  9/10/2021 07:31:00.     Procedures Performed: Procedure Code   Esophagogastroduodenoscopy, flexible, transoral; diagnostic, including collection of specimen(s) by brushing or washing, when performed (separate procedure) (22002)..     Indications: 55-year-old white female with chronic hoarseness referred for diagnostic EGD for evaluation.     Preoperative Diagnosis: Chronic hoarseness (SGC74-RQ R49.0).     Postoperative Diagnosis: Chronic hoarseness (JDS86-MT R49.0).     Surgeon: Christine Sauer MD.     Anesthesia: Intravenous MAC.     Speciman Removed: None.     Description of Procedure/Findings/    Complications: Patient was brought to the endoscopy suite laid in a slight supine position.  Intravenous anesthesia was provided.  Oral bite plate placed in the mouth.  An endoscope was then passed through the oropharynx intubating the esophagus with easy transit into the stomach.  Upon entering the stomach it was fully insufflated.  Overall the gastric lumen appeared to be grossly normal.  Scope was then advanced in the duodenum reaching the second and third portions which was also normal.  Scope was withdrawn back in the stomach retroflexed revealing a normal-appearing cardia fundus and proximal body.  There was no hiatal hernia seen.  Scope returned to neutral position the stomach was evacuated of air.  The scope was withdrawn back to the Z-line which measured about 40 cm from the incisors.  The GE junction was grossly normal the lower esophagus was normal.  Scope was then slowly withdrawn the remainder the esophagus appeared to be grossly normal as well as the upper airway.  Scope was removed in neutral position the patient  was then relieved of anesthesia stable condition and transferred postanesthesia care unit..     Esimated blood loss: No blood loss.     Findings: Normal EGD.     Complications: None.

## 2022-05-14 NOTE — OP NOTE
Patient:   Rosa Ramsey            MRN: [id number]            FIN: [id number]               Age:   56 years     Sex:  Female     :  1965   Associated Diagnoses:   Encounter for screening colonoscopy   Author:   Christine Sauer MD      Operative Note   Operative Information   Date/ Time:  3/16/2022 08:16:00.     Procedures Performed: Procedure Code   Colonoscopy, flexible; diagnostic, including collection of specimen(s) by brushing or washing, when performed (separate procedure) (33989)..     Indications: 56-year-old white female in need of first age-appropriate colonoscopy with no family history colorectal cancer.     Preoperative Diagnosis: Encounter for screening colonoscopy (QTT93-AJ Z12.11).     Postoperative Diagnosis: Encounter for screening colonoscopy (QCM06-EH Z12.11).     Surgeon: Christine Sauer MD.     Anesthesia: Intravenous MAC.     Speciman Removed: None.     Description of Procedure/Findings/    Complications: Patient was brought to the endoscopy suite laid in a left lateral decubitus position right side up.  Intravenous anesthesia was provided.  Digital rectal exam performed exhibiting good anal rectal tone and no masses.  An endoscope was then passed through the anus intubating the rectum and with gentle insufflation reaching the cecum.  Upon reaching the cecum pictures were taken the scope was then slowly withdrawn.  Overall the cecum ascending transverse descending and rectosigmoid colon all appear to be grossly normal without mass polyp or ulceration seen.  Upon reaching the distal rectum the scope was retroflexed revealing normal-appearing perianal mucosa no significant hemorrhoids.  A small sclerotic hemorrhoid was found but otherwise normal.  Scope was returned to neutral position the colon was evacuated of air.  Patient was then relieved of anesthesia stable condition and transferred postanesthesia care unit..     Esimated blood loss: No blood loss.     Findings:  Normal colonoscopy.     Complications: None.     Notes: Recommendation repeat colonoscopy to 10-year interval.

## 2022-05-23 RX ORDER — TOLTERODINE TARTRATE 2 MG/1
2 TABLET, EXTENDED RELEASE ORAL 2 TIMES DAILY
COMMUNITY
Start: 2022-01-13 | End: 2022-05-23 | Stop reason: SDUPTHER

## 2022-05-27 RX ORDER — TOLTERODINE TARTRATE 2 MG/1
2 TABLET, EXTENDED RELEASE ORAL 2 TIMES DAILY
Qty: 60 TABLET | Refills: 2 | Status: SHIPPED | OUTPATIENT
Start: 2022-05-27 | End: 2022-06-14 | Stop reason: SDUPTHER

## 2022-06-07 DIAGNOSIS — E78.5 HYPERLIPIDEMIA, UNSPECIFIED HYPERLIPIDEMIA TYPE: Primary | ICD-10-CM

## 2022-06-08 ENCOUNTER — LAB VISIT (OUTPATIENT)
Dept: LAB | Facility: HOSPITAL | Age: 57
End: 2022-06-08
Attending: NURSE PRACTITIONER
Payer: MEDICAID

## 2022-06-08 ENCOUNTER — OFFICE VISIT (OUTPATIENT)
Dept: CARDIOLOGY | Facility: CLINIC | Age: 57
End: 2022-06-08
Payer: MEDICAID

## 2022-06-08 VITALS
DIASTOLIC BLOOD PRESSURE: 69 MMHG | RESPIRATION RATE: 20 BRPM | SYSTOLIC BLOOD PRESSURE: 101 MMHG | HEART RATE: 79 BPM | HEIGHT: 64 IN | OXYGEN SATURATION: 98 % | BODY MASS INDEX: 26.08 KG/M2 | TEMPERATURE: 98 F | WEIGHT: 152.75 LBS

## 2022-06-08 DIAGNOSIS — R07.89 OTHER CHEST PAIN: ICD-10-CM

## 2022-06-08 DIAGNOSIS — E78.5 HYPERLIPIDEMIA, UNSPECIFIED HYPERLIPIDEMIA TYPE: ICD-10-CM

## 2022-06-08 DIAGNOSIS — E78.5 HYPERLIPIDEMIA LDL GOAL <100: ICD-10-CM

## 2022-06-08 DIAGNOSIS — Z72.0 TOBACCO USE: ICD-10-CM

## 2022-06-08 LAB
CHOLEST SERPL-MCNC: 203 MG/DL
CHOLEST/HDLC SERPL: 3 {RATIO} (ref 0–5)
HDLC SERPL-MCNC: 62 MG/DL (ref 35–60)
LDLC SERPL CALC-MCNC: 115 MG/DL (ref 50–140)
TRIGL SERPL-MCNC: 132 MG/DL (ref 37–140)
VLDLC SERPL CALC-MCNC: 26 MG/DL

## 2022-06-08 PROCEDURE — 3008F PR BODY MASS INDEX (BMI) DOCUMENTED: ICD-10-PCS | Mod: CPTII,,, | Performed by: NURSE PRACTITIONER

## 2022-06-08 PROCEDURE — 3074F PR MOST RECENT SYSTOLIC BLOOD PRESSURE < 130 MM HG: ICD-10-PCS | Mod: CPTII,,, | Performed by: NURSE PRACTITIONER

## 2022-06-08 PROCEDURE — 99214 OFFICE O/P EST MOD 30 MIN: CPT | Mod: PBBFAC | Performed by: NURSE PRACTITIONER

## 2022-06-08 PROCEDURE — 99214 OFFICE O/P EST MOD 30 MIN: CPT | Mod: S$PBB,,, | Performed by: NURSE PRACTITIONER

## 2022-06-08 PROCEDURE — 1160F PR REVIEW ALL MEDS BY PRESCRIBER/CLIN PHARMACIST DOCUMENTED: ICD-10-PCS | Mod: CPTII,,, | Performed by: NURSE PRACTITIONER

## 2022-06-08 PROCEDURE — 3074F SYST BP LT 130 MM HG: CPT | Mod: CPTII,,, | Performed by: NURSE PRACTITIONER

## 2022-06-08 PROCEDURE — 99214 PR OFFICE/OUTPT VISIT, EST, LEVL IV, 30-39 MIN: ICD-10-PCS | Mod: S$PBB,,, | Performed by: NURSE PRACTITIONER

## 2022-06-08 PROCEDURE — 3078F DIAST BP <80 MM HG: CPT | Mod: CPTII,,, | Performed by: NURSE PRACTITIONER

## 2022-06-08 PROCEDURE — 1160F RVW MEDS BY RX/DR IN RCRD: CPT | Mod: CPTII,,, | Performed by: NURSE PRACTITIONER

## 2022-06-08 PROCEDURE — 1159F PR MEDICATION LIST DOCUMENTED IN MEDICAL RECORD: ICD-10-PCS | Mod: CPTII,,, | Performed by: NURSE PRACTITIONER

## 2022-06-08 PROCEDURE — 36415 COLL VENOUS BLD VENIPUNCTURE: CPT

## 2022-06-08 PROCEDURE — 80061 LIPID PANEL: CPT

## 2022-06-08 PROCEDURE — 1159F MED LIST DOCD IN RCRD: CPT | Mod: CPTII,,, | Performed by: NURSE PRACTITIONER

## 2022-06-08 PROCEDURE — 3078F PR MOST RECENT DIASTOLIC BLOOD PRESSURE < 80 MM HG: ICD-10-PCS | Mod: CPTII,,, | Performed by: NURSE PRACTITIONER

## 2022-06-08 PROCEDURE — 3008F BODY MASS INDEX DOCD: CPT | Mod: CPTII,,, | Performed by: NURSE PRACTITIONER

## 2022-06-08 RX ORDER — AMITRIPTYLINE HYDROCHLORIDE 25 MG/1
TABLET, FILM COATED ORAL
COMMUNITY
End: 2023-03-02 | Stop reason: DRUGHIGH

## 2022-06-08 RX ORDER — PROPRANOLOL HYDROCHLORIDE 10 MG/1
20 TABLET ORAL 3 TIMES DAILY
COMMUNITY
Start: 2022-02-07 | End: 2022-06-08 | Stop reason: SDUPTHER

## 2022-06-08 RX ORDER — MONTELUKAST SODIUM 10 MG/1
10 TABLET ORAL NIGHTLY
COMMUNITY

## 2022-06-08 RX ORDER — PROPRANOLOL HYDROCHLORIDE 10 MG/1
20 TABLET ORAL 3 TIMES DAILY
Qty: 180 TABLET | Refills: 11 | Status: SHIPPED | OUTPATIENT
Start: 2022-06-08 | End: 2023-06-15

## 2022-06-08 RX ORDER — NITROGLYCERIN 0.4 MG/1
0.4 TABLET SUBLINGUAL
COMMUNITY
Start: 2022-02-07 | End: 2023-06-15 | Stop reason: SDUPTHER

## 2022-06-08 RX ORDER — METHOCARBAMOL 750 MG/1
1500 TABLET, FILM COATED ORAL 3 TIMES DAILY
COMMUNITY
Start: 2022-02-16

## 2022-06-08 RX ORDER — PENTOSAN POLYSULFATE SODIUM 100 MG/1
100 CAPSULE, GELATIN COATED ORAL 3 TIMES DAILY
COMMUNITY
End: 2022-06-14 | Stop reason: SDUPTHER

## 2022-06-08 NOTE — PATIENT INSTRUCTIONS
Refer to smoking cessation program  CMP, FLP in 6 months  Follow up in Cardiology Clinic in 6 months

## 2022-06-08 NOTE — PROGRESS NOTES
CHIEF COMPLAINT:   Chief Complaint   Patient presents with    3 month follow up     Chest Pain                   Review of patient's allergies indicates:   Allergen Reactions    Meperidine Nausea And Vomiting                                          HPI:  Rosa Ramsey 56 y.o. female with a past medical history of chronic tobacco use, cystitis, GERD, and atypical chest pain presents for 4 month follow up and ongoing care.  Patient completed an Exercise Echocardiogram in October 2021 which revealed no wall motion abnormalities. See full report below.  Patient is status post right knee surgery in November 2021. She completed an Echocardiogram in January 2021 which revealed an ejection fraction of approximately 50 to 55%.     Patient reported an episode of chest discomfort last night after a long stressful.  She described the chest pain as a stabbing sensation that quickly resolved on its own.  She reported another episode about 2 and half weeks prior in which she states she also had a very stressful day.  She denies use of sublingual nitroglycerin.  She denies shortness of breath, palpitations, orthopnea, PND, peripheral edema, or syncope.  She continues to smoke approximately 3-4 cigarettes per day, but is interested in quitting.  She is requesting a referral to the smoking cessation program.  She reports compliance with her propranolol.       Exercise stress echocardiogram October 21, 2021:  Stress echocardiogram reveals no wall motion abnormalities with maximal stress.  Good chronotropic response.  Good prognosis treadmill stress test.  Good exercise tolerance. Patient is 56 years old, and exercised for 8 minutes and 45 seconds. Performed 10 METs.  Achieved 100% of maximum predicted heart rate.  No EKG changes on stress EKG.  No chest pain during the treadmill stress test.  Castro treadmill score is 8 (greater than 5 which indicates low risk).  No significant changes compared to baseline.  No rhythm  abnormality.  No cardiovascular symptoms with maximum exercise.  Summary:  No wall motion abnormalities seen on stress echocardiography.     Echocardiogram January 2021:  Left ventricular size is normal.  Left ventricular ejection fraction is measured at approximately 50 to 55%.  Trace mitral regurgitation.  Structurally aortic valve is trileaflet.  Trace tricuspid regurgitation with an RVSP of 17 mmHg.  Trace pulmonic regurgitation present.  Normal size left atrium.  Normal right atrial size.  Normal right ventricular size.  No evidence of a pericardial effusion.  No evidence of pleural effusion.  IVC is normal.    Exercise nuclear stress test January 2020:  Scan suggest: No ischemia  No scar  Ejection fraction: 97%  No wall motion abnormality    Echocardiogram June 2, 2017: LVEF estimated at 59%  E/A flow reversal noted. Suggestive of diastolic dysfunction  Normal right ventricular size with preserved RV function  Structurally normal aortic valve and excellent structurally normal mitral valve  No evidence of any pericardial effusion                                                                                                                                                                                           There is no problem list on file for this patient.    Past Surgical History:   Procedure Laterality Date    PARTIAL HYSTERECTOMY      right knee surgery       Social History     Socioeconomic History    Marital status: Single   Tobacco Use    Smoking status: Current Every Day Smoker     Packs/day: 0.25     Years: 25.00     Pack years: 6.25     Types: Cigarettes    Smokeless tobacco: Never Used   Substance and Sexual Activity    Alcohol use: Yes    Drug use: Never        Family History   Problem Relation Age of Onset    Asthma Mother     Hyperlipidemia Mother     Hypertension Mother     Lung cancer Father          Current Outpatient Medications:     amitriptyline (ELAVIL) 25 MG tablet,  "amitriptyline 25 mg tablet, Disp: , Rfl:     methocarbamoL (ROBAXIN) 750 MG Tab, Take 1,500 mg by mouth 3 (three) times daily., Disp: , Rfl:     montelukast (SINGULAIR) 10 mg tablet, Take 10 mg by mouth every evening., Disp: , Rfl:     nitroGLYCERIN (NITROSTAT) 0.4 MG SL tablet, Place 0.4 mg under the tongue as needed., Disp: , Rfl:     pentosan polysulfate (ELMIRON) 100 mg Cap, Take 100 mg by mouth 3 (three) times daily., Disp: , Rfl:     propranoloL (INDERAL) 10 MG tablet, Take 20 mg by mouth 3 (three) times daily., Disp: , Rfl:     tolterodine (DETROL) 2 MG Tab, Take 1 tablet (2 mg total) by mouth 2 (two) times daily., Disp: 60 tablet, Rfl: 2     ROS:                                                                                                                                                                             Review of Systems   Constitutional: Negative.    Respiratory: Negative.    Cardiovascular: Positive for chest pain.   Gastrointestinal: Negative.    Musculoskeletal: Negative.    Skin: Negative.    Neurological: Negative.    Psychiatric/Behavioral: Negative.         Blood pressure 101/69, pulse 79, temperature 98.1 °F (36.7 °C), resp. rate 20, height 5' 3.78" (1.62 m), weight 69.3 kg (152 lb 12.5 oz), SpO2 98 %.   PE:  Physical Exam  Constitutional:       Appearance: Normal appearance.   HENT:      Head: Normocephalic and atraumatic.   Eyes:      Extraocular Movements: Extraocular movements intact.      Pupils: Pupils are equal, round, and reactive to light.   Cardiovascular:      Rate and Rhythm: Normal rate and regular rhythm.   Pulmonary:      Effort: Pulmonary effort is normal.      Breath sounds: Normal breath sounds.   Abdominal:      Palpations: Abdomen is soft.   Musculoskeletal:         General: Normal range of motion.      Cervical back: Normal range of motion. No tenderness.   Skin:     General: Skin is warm and dry.   Neurological:      General: No focal deficit present.      " Mental Status: She is alert and oriented to person, place, and time.   Psychiatric:         Mood and Affect: Mood normal.         Behavior: Behavior normal.         ASSESSMENT/PLAN:  Chest pain  Reported a couple episodes of CP after a long stressful day per patient's report - denies use of SL Nitro  Exercise Stress Echocardiogram Oct. 2021 - No wall motion abnormalities seen on stress echocardiography  Continue Propranolol as directed    Diastolic dysfunction per Echo June 2017  EF of 50-55% per Echo Jan. 2021   Denies SOB    GERD  Management per PCP    HLD  LDL improved - 115  Counseled on low cholesterol diet and exercise to get LDL to goal of < 100      Chronic tobacco use  Counseled on the importance of smoking cessation - she smokes about 3-4 cigarettes per day  Patient is ready to quit  Will refer to smoking cessation program    Refer to smoking cessation program  HARVEY SALMERON in 6 months  Follow up in Cardiology Clinic in 6 months

## 2022-06-14 ENCOUNTER — OFFICE VISIT (OUTPATIENT)
Dept: GYNECOLOGY | Facility: CLINIC | Age: 57
End: 2022-06-14
Payer: MEDICAID

## 2022-06-14 VITALS
HEART RATE: 76 BPM | DIASTOLIC BLOOD PRESSURE: 75 MMHG | OXYGEN SATURATION: 100 % | TEMPERATURE: 98 F | WEIGHT: 153 LBS | SYSTOLIC BLOOD PRESSURE: 111 MMHG | RESPIRATION RATE: 16 BRPM | BODY MASS INDEX: 27.11 KG/M2 | HEIGHT: 63 IN

## 2022-06-14 DIAGNOSIS — Z01.419 WOMEN'S ANNUAL ROUTINE GYNECOLOGICAL EXAMINATION: Primary | ICD-10-CM

## 2022-06-14 DIAGNOSIS — Z12.31 SCREENING MAMMOGRAM FOR BREAST CANCER: ICD-10-CM

## 2022-06-14 DIAGNOSIS — R35.0 URINARY FREQUENCY: ICD-10-CM

## 2022-06-14 DIAGNOSIS — N30.10 INTERSTITIAL CYSTITIS: ICD-10-CM

## 2022-06-14 LAB
APPEARANCE UR: CLEAR
BACTERIA #/AREA URNS AUTO: ABNORMAL /HPF
BILIRUB SERPL-MCNC: NORMAL MG/DL
BILIRUB UR QL STRIP.AUTO: NEGATIVE MG/DL
BLOOD URINE, POC: NORMAL
COLOR UR AUTO: ABNORMAL
COLOR, POC UA: YELLOW
GLUCOSE UR QL STRIP.AUTO: NORMAL MG/DL
GLUCOSE UR QL STRIP: NORMAL
HYALINE CASTS #/AREA URNS LPF: ABNORMAL /LPF
KETONES UR QL STRIP.AUTO: NEGATIVE MG/DL
KETONES UR QL STRIP: NORMAL
LEUKOCYTE ESTERASE UR QL STRIP.AUTO: NEGATIVE UNIT/L
LEUKOCYTE ESTERASE URINE, POC: NORMAL
MUCOUS THREADS URNS QL MICRO: ABNORMAL /LPF
NITRITE UR QL STRIP.AUTO: NEGATIVE
NITRITE, POC UA: NORMAL
PH UR STRIP.AUTO: 5.5 [PH]
PH, POC UA: 5.5
PROT UR QL STRIP.AUTO: NEGATIVE MG/DL
PROTEIN, POC: NORMAL
RBC #/AREA URNS AUTO: ABNORMAL /HPF
RBC UR QL AUTO: ABNORMAL UNIT/L
SP GR UR STRIP.AUTO: 1.01
SPECIFIC GRAVITY, POC UA: 1.01
SQUAMOUS #/AREA URNS LPF: ABNORMAL /HPF
UROBILINOGEN UR STRIP-ACNC: NORMAL MG/DL
UROBILINOGEN, POC UA: NORMAL
WBC #/AREA URNS AUTO: ABNORMAL /HPF

## 2022-06-14 PROCEDURE — 99215 OFFICE O/P EST HI 40 MIN: CPT | Mod: PBBFAC | Performed by: NURSE PRACTITIONER

## 2022-06-14 PROCEDURE — 81001 URINALYSIS AUTO W/SCOPE: CPT | Mod: PBBFAC | Performed by: NURSE PRACTITIONER

## 2022-06-14 PROCEDURE — 1160F RVW MEDS BY RX/DR IN RCRD: CPT | Mod: CPTII,,, | Performed by: NURSE PRACTITIONER

## 2022-06-14 PROCEDURE — 1160F PR REVIEW ALL MEDS BY PRESCRIBER/CLIN PHARMACIST DOCUMENTED: ICD-10-PCS | Mod: CPTII,,, | Performed by: NURSE PRACTITIONER

## 2022-06-14 PROCEDURE — 3008F PR BODY MASS INDEX (BMI) DOCUMENTED: ICD-10-PCS | Mod: CPTII,,, | Performed by: NURSE PRACTITIONER

## 2022-06-14 PROCEDURE — 1159F PR MEDICATION LIST DOCUMENTED IN MEDICAL RECORD: ICD-10-PCS | Mod: CPTII,,, | Performed by: NURSE PRACTITIONER

## 2022-06-14 PROCEDURE — 99396 PR PREVENTIVE VISIT,EST,40-64: ICD-10-PCS | Mod: S$PBB,,, | Performed by: NURSE PRACTITIONER

## 2022-06-14 PROCEDURE — 1159F MED LIST DOCD IN RCRD: CPT | Mod: CPTII,,, | Performed by: NURSE PRACTITIONER

## 2022-06-14 PROCEDURE — 3078F PR MOST RECENT DIASTOLIC BLOOD PRESSURE < 80 MM HG: ICD-10-PCS | Mod: CPTII,,, | Performed by: NURSE PRACTITIONER

## 2022-06-14 PROCEDURE — 81001 URINALYSIS AUTO W/SCOPE: CPT | Performed by: NURSE PRACTITIONER

## 2022-06-14 PROCEDURE — 99396 PREV VISIT EST AGE 40-64: CPT | Mod: S$PBB,,, | Performed by: NURSE PRACTITIONER

## 2022-06-14 PROCEDURE — 3074F SYST BP LT 130 MM HG: CPT | Mod: CPTII,,, | Performed by: NURSE PRACTITIONER

## 2022-06-14 PROCEDURE — 3008F BODY MASS INDEX DOCD: CPT | Mod: CPTII,,, | Performed by: NURSE PRACTITIONER

## 2022-06-14 PROCEDURE — 87088 URINE BACTERIA CULTURE: CPT | Performed by: NURSE PRACTITIONER

## 2022-06-14 PROCEDURE — 3078F DIAST BP <80 MM HG: CPT | Mod: CPTII,,, | Performed by: NURSE PRACTITIONER

## 2022-06-14 PROCEDURE — 3074F PR MOST RECENT SYSTOLIC BLOOD PRESSURE < 130 MM HG: ICD-10-PCS | Mod: CPTII,,, | Performed by: NURSE PRACTITIONER

## 2022-06-14 RX ORDER — TOLTERODINE TARTRATE 2 MG/1
2 TABLET, EXTENDED RELEASE ORAL 2 TIMES DAILY
Qty: 60 TABLET | Refills: 1 | Status: SHIPPED | OUTPATIENT
Start: 2022-06-14 | End: 2022-07-28 | Stop reason: ALTCHOICE

## 2022-06-14 RX ORDER — PENTOSAN POLYSULFATE SODIUM 100 MG/1
100 CAPSULE, GELATIN COATED ORAL 3 TIMES DAILY
Qty: 90 CAPSULE | Refills: 1 | Status: SHIPPED | OUTPATIENT
Start: 2022-06-14 | End: 2022-07-28 | Stop reason: DRUGHIGH

## 2022-06-14 RX ORDER — ALENDRONATE SODIUM 70 MG/1
70 TABLET ORAL
COMMUNITY
End: 2022-12-05

## 2022-06-14 NOTE — PROGRESS NOTES
"Patient ID: Rosa Ramsey is a 56 y.o. female.    Chief Complaint: Well Woman Exam        HPI:  The patient is  (SABx2) here for annual gyn exam. Denies history of abnormal pap smear. Reports hx of partial hysterectomy secondary to AUB. Denies vaginal spotting/discharge. Pt has hx of IC. She was established with uro/gyn but that service is no longer here. She needs referral to urology. She is currently on Elmiron and detrol and is asking for refills until she can get established with a new provider. States is established with outside opthalmology, Dr. Martinez in [place], and sees him annually. Today, she reports urinary frequency. Denies dysuria. Denies hematuria. Pt is smoker and recently got established with cessation program. Denies hx of renal stones. States attempted premarin vaginal ointment in the past but could not tolerate. She is not sexually active. Pt had colonoscopy 3/2022. Next due in 5 years per pt. Pt has hx of osteoporosis and is currently on weekly fosamax. This is managed by her new pcp in [place].      Review of Systems:   Negative except for findings in HPI     Objective:   /75   Pulse 76   Temp 98.3 °F (36.8 °C)   Resp 16   Ht 5' 3" (1.6 m)   Wt 69.4 kg (153 lb)   SpO2 100%   BMI 27.10 kg/m²    Physical Exam:  GENERAL: Pt is aware and alert and  in no acute distress.  BREASTS: Bilateral-No masses, nipple discharge, skin changes, tenderness.  ABDOMEN: Soft, non tender.  VULVA: General appearance normal; external genitalia with no lesions or erythema.  URETHRA: No lesions  BLADDER: No tenderness.  VAGINA: Mucosa pale,no discharge or lesions.  CERVIX:  absent  BIMANUAL EXAM:Uterus absent; no adnexal fullness/tenderness  SKIN: Normal to inspection with no rashes, lesions, or ulcers.  PSYCHIATRIC: Patient is awake and alert. Mood and affect are normal.    Assessment:   Women's annual routine gynecological examination    Urinary frequency  -     POCT urinalysis, dipstick or " tablet reag  -     Urine culture  -     Urinalysis, Reflex to Urine Culture Urine, Clean Catch  -     Ambulatory referral/consult to Urology; Future; Expected date: 06/21/2022    Interstitial cystitis  -     POCT urinalysis, dipstick or tablet reag  -     Urine culture  -     Urinalysis, Reflex to Urine Culture Urine, Clean Catch  -     Ambulatory referral/consult to Urology; Future; Expected date: 06/21/2022    Screening mammogram for breast cancer  -     Mammo Digital Screening Bilat w/ [facility]; Future; Expected date: 12/14/2022    Other orders  -     pentosan polysulfate (ELMIRON) 100 mg Cap; Take 1 capsule (100 mg total) by mouth 3 (three) times daily.  Dispense: 90 capsule; Refill: 1  -     tolterodine (DETROL) 2 MG Tab; Take 1 tablet (2 mg total) by mouth 2 (two) times daily.  Dispense: 60 tablet; Refill: 1            1. Women's annual routine gynecological examination    2. Urinary frequency    3. Interstitial cystitis    4. Screening mammogram for breast cancer             -pelvic; pap deferred secondary to hyst for benign conditions per acog guidelines  -mammogram ordered  -2 month refill of detrol/elmiron; keep appts with ophthalmology; refer to urology  Plan:       Follow up in about 1 year (around 6/14/2023).

## 2022-06-16 LAB — BACTERIA UR CULT: NO GROWTH

## 2022-06-23 ENCOUNTER — CLINICAL SUPPORT (OUTPATIENT)
Dept: SMOKING CESSATION | Facility: CLINIC | Age: 57
End: 2022-06-23
Payer: COMMERCIAL

## 2022-06-23 DIAGNOSIS — F17.200 NICOTINE DEPENDENCE: Primary | ICD-10-CM

## 2022-06-23 PROCEDURE — 99404 PREV MED CNSL INDIV APPRX 60: CPT | Mod: S$GLB,,, | Performed by: INTERNAL MEDICINE

## 2022-06-23 PROCEDURE — 99404 PR PREVENT COUNSEL,INDIV,60 MIN: ICD-10-PCS | Mod: S$GLB,,, | Performed by: INTERNAL MEDICINE

## 2022-06-23 RX ORDER — MELOXICAM 15 MG/1
15 TABLET ORAL DAILY
COMMUNITY

## 2022-06-23 RX ORDER — MICONAZOLE NITRATE 2 %
2 CREAM (GRAM) TOPICAL
Qty: 190 EACH | Refills: 0 | Status: SHIPPED | OUTPATIENT
Start: 2022-06-23

## 2022-06-24 NOTE — PROGRESS NOTES
Patient will be participating in biweekly tobacco cessation meetings and will begin the prescribed tobacco cessation medication regimen of 2 mg nicotine gum. Pt was hesitant to try any other smoking cessation medication at this time. Patient currently smokes 4-5 cigarettes per day. Discussed the role of tobacco cessation program, role of nicotine replacement therapy (NRT) and behavioral changes to assist the patient to reach her goal of being tobacco free. Education and instruction on the role of the NRT and proper usage of the nicotine gum. Patient verbalized understanding and willingness to use the gum.  Pt started on rate reduction and wait time of 15 min prior to smoking.

## 2022-07-26 NOTE — PROGRESS NOTES
Chief Complaint: No chief complaint on file.      HPI:  Patient is a 56-year-old female referred to Urology for urinary frequency.  Patient seen by OBGYN on 06/14/2022 with complaints of urinary frequency patient placed on Detrol generic 2 mg, Elmiron for IC. Today patient presents without dysuria, urinary urgency, frequency, incontinence, retention, gross hematuria, patient does have mild nocturia 3 times a night but admits to drinking prior to bedtime and throughout the night. Patient denies family history of urologic pathology.      Allergies:  Review of patient's allergies indicates:   Allergen Reactions    Meperidine Nausea And Vomiting       Medications:  Current Outpatient Medications   Medication Sig Dispense Refill    alendronate (FOSAMAX) 70 MG tablet Take 70 mg by mouth every 7 days.      amitriptyline (ELAVIL) 25 MG tablet amitriptyline 25 mg tablet      meloxicam (MOBIC) 15 MG tablet Take 15 mg by mouth once daily.      methocarbamoL (ROBAXIN) 750 MG Tab Take 1,500 mg by mouth 3 (three) times daily.      montelukast (SINGULAIR) 10 mg tablet Take 10 mg by mouth every evening.      nicotine, polacrilex, (NICORETTE) 2 mg Gum Take 1 each (2 mg total) by mouth as needed (Do not exceed more than 10 pieces in 24 hours). 190 each 0    nitroGLYCERIN (NITROSTAT) 0.4 MG SL tablet Place 0.4 mg under the tongue as needed.      pentosan polysulfate (ELMIRON) 100 mg Cap Take 1 capsule (100 mg total) by mouth 3 (three) times daily. 90 capsule 1    propranoloL (INDERAL) 10 MG tablet Take 2 tablets (20 mg total) by mouth 3 (three) times daily. 180 tablet 11    tolterodine (DETROL) 2 MG Tab Take 1 tablet (2 mg total) by mouth 2 (two) times daily. 60 tablet 1     No current facility-administered medications for this visit.       Review of Systems:  General: No fever, chills, fatigability, or weight loss.  Skin: No rashes, itching, or changes in color or texture of skin.  Chest: Denies ISLAS, cyanosis, wheezing,  cough, and sputum production.  Abdomen: Appetite fine. No weight loss. Denies diarrhea, abdominal pain, hematemesis, or blood in stool.  Musculoskeletal: No joint stiffness or swelling. Denies back pain.  : As above.  All other review of systems negative.    PMH:  Past Medical History:   Diagnosis Date    Acid reflux     Arthritis     H/O arthroscopic knee surgery     History of esophagogastroduodenoscopy (EGD)     History of partial hysterectomy     Hx of colonoscopy     Neuropathy     Osteopenia     Osteoporosis     Smoker        PSH:  Past Surgical History:   Procedure Laterality Date    PARTIAL HYSTERECTOMY      right knee surgery         FamHx:  Family History   Problem Relation Age of Onset    Asthma Mother     Hyperlipidemia Mother     Hypertension Mother     Lung cancer Father        SocHx:  Social History     Socioeconomic History    Marital status: Single   Tobacco Use    Smoking status: Current Every Day Smoker     Packs/day: 0.25     Years: 25.00     Pack years: 6.25     Types: Cigarettes    Smokeless tobacco: Never Used   Substance and Sexual Activity    Alcohol use: Yes    Drug use: Never    Sexual activity: Not Currently     Birth control/protection: None       Physical Exam:  There were no vitals filed for this visit.  General: A&Ox3, no apparent distress, no deformities  Neck: No masses, normal thyroid  Lungs: CTA shahrzad, no use of accessory muscles  Heart: RRR, no arrhythmias  Abdomen: Soft, NT, ND, no masses, no hernias, no hepatosplenomegaly  Lymphatic: Neck and groin nodes negative  Skin: The skin is warm and dry. No jaundice.  Ext: No c/c/e.    GUFemale: External genitalia normal. No lesions. Meatus normal size and location. Urethra normal. No masses. Bladder normal. No fullness or masses. Vagina normal with discharge or lesions. Anus/perineum normal. Uterus and adnexa no palpable abnormalities.    Labs:  No labs      Imaging:  No imaging      Impression:  Urinary  frequency, overactive bladder, I see    Plan:   Instructed patient to continue Elmiron, discontinue Detrol 2 mg b.i.d. change to oxybutynin ER 5 mg p.o. q.h.s.. Instructed patient on timed voiding every 2-3 hours, double voiding, avoidance of bladder irritants such as alcohol citrus foods & drinks, chocolates, caffeinated drinks, energy drinks, spicey foods, sodas, increase water intake.

## 2022-07-28 ENCOUNTER — OFFICE VISIT (OUTPATIENT)
Dept: UROLOGY | Facility: CLINIC | Age: 57
End: 2022-07-28
Payer: MEDICAID

## 2022-07-28 VITALS
HEIGHT: 65 IN | TEMPERATURE: 98 F | BODY MASS INDEX: 25.42 KG/M2 | WEIGHT: 152.56 LBS | OXYGEN SATURATION: 99 % | DIASTOLIC BLOOD PRESSURE: 75 MMHG | RESPIRATION RATE: 20 BRPM | HEART RATE: 81 BPM | SYSTOLIC BLOOD PRESSURE: 109 MMHG

## 2022-07-28 DIAGNOSIS — N32.81 OVERACTIVE BLADDER: ICD-10-CM

## 2022-07-28 DIAGNOSIS — N30.10 INTERSTITIAL CYSTITIS: ICD-10-CM

## 2022-07-28 DIAGNOSIS — R35.0 URINARY FREQUENCY: ICD-10-CM

## 2022-07-28 PROCEDURE — 3078F PR MOST RECENT DIASTOLIC BLOOD PRESSURE < 80 MM HG: ICD-10-PCS | Mod: CPTII,,, | Performed by: NURSE PRACTITIONER

## 2022-07-28 PROCEDURE — 3008F BODY MASS INDEX DOCD: CPT | Mod: CPTII,,, | Performed by: NURSE PRACTITIONER

## 2022-07-28 PROCEDURE — 1159F PR MEDICATION LIST DOCUMENTED IN MEDICAL RECORD: ICD-10-PCS | Mod: CPTII,,, | Performed by: NURSE PRACTITIONER

## 2022-07-28 PROCEDURE — 99215 OFFICE O/P EST HI 40 MIN: CPT | Mod: PBBFAC | Performed by: NURSE PRACTITIONER

## 2022-07-28 PROCEDURE — 3074F PR MOST RECENT SYSTOLIC BLOOD PRESSURE < 130 MM HG: ICD-10-PCS | Mod: CPTII,,, | Performed by: NURSE PRACTITIONER

## 2022-07-28 PROCEDURE — 3074F SYST BP LT 130 MM HG: CPT | Mod: CPTII,,, | Performed by: NURSE PRACTITIONER

## 2022-07-28 PROCEDURE — 1160F PR REVIEW ALL MEDS BY PRESCRIBER/CLIN PHARMACIST DOCUMENTED: ICD-10-PCS | Mod: CPTII,,, | Performed by: NURSE PRACTITIONER

## 2022-07-28 PROCEDURE — 1159F MED LIST DOCD IN RCRD: CPT | Mod: CPTII,,, | Performed by: NURSE PRACTITIONER

## 2022-07-28 PROCEDURE — 3078F DIAST BP <80 MM HG: CPT | Mod: CPTII,,, | Performed by: NURSE PRACTITIONER

## 2022-07-28 PROCEDURE — 99204 OFFICE O/P NEW MOD 45 MIN: CPT | Mod: S$PBB,,, | Performed by: NURSE PRACTITIONER

## 2022-07-28 PROCEDURE — 1160F RVW MEDS BY RX/DR IN RCRD: CPT | Mod: CPTII,,, | Performed by: NURSE PRACTITIONER

## 2022-07-28 PROCEDURE — 99204 PR OFFICE/OUTPT VISIT, NEW, LEVL IV, 45-59 MIN: ICD-10-PCS | Mod: S$PBB,,, | Performed by: NURSE PRACTITIONER

## 2022-07-28 PROCEDURE — 3008F PR BODY MASS INDEX (BMI) DOCUMENTED: ICD-10-PCS | Mod: CPTII,,, | Performed by: NURSE PRACTITIONER

## 2022-07-28 RX ORDER — OXYBUTYNIN CHLORIDE 5 MG/1
5 TABLET, EXTENDED RELEASE ORAL DAILY
Qty: 30 TABLET | Refills: 11 | Status: SHIPPED | OUTPATIENT
Start: 2022-07-28 | End: 2022-09-13 | Stop reason: ALTCHOICE

## 2022-07-28 RX ORDER — NAPROXEN SODIUM 220 MG/1
81 TABLET, FILM COATED ORAL DAILY
COMMUNITY

## 2022-07-28 RX ORDER — CHOLECALCIFEROL (VITAMIN D3) 25 MCG
1000 TABLET ORAL DAILY
COMMUNITY
End: 2022-12-05

## 2022-08-02 ENCOUNTER — HOSPITAL ENCOUNTER (OUTPATIENT)
Dept: RADIOLOGY | Facility: HOSPITAL | Age: 57
Discharge: HOME | End: 2022-08-02
Attending: NURSE PRACTITIONER
Payer: MEDICAID

## 2022-08-02 DIAGNOSIS — M79.671 PAIN OF RIGHT FOOT: ICD-10-CM

## 2022-08-02 PROCEDURE — 73630 X-RAY EXAM OF FOOT: CPT | Mod: TC,RT

## 2022-09-13 DIAGNOSIS — N32.81 OVERACTIVE BLADDER: Primary | ICD-10-CM

## 2022-09-13 RX ORDER — TOLTERODINE TARTRATE 2 MG/1
2 TABLET, EXTENDED RELEASE ORAL 2 TIMES DAILY
Qty: 60 TABLET | Refills: 11 | Status: SHIPPED | OUTPATIENT
Start: 2022-09-13 | End: 2023-01-26

## 2022-09-15 ENCOUNTER — TELEPHONE (OUTPATIENT)
Dept: SMOKING CESSATION | Facility: CLINIC | Age: 57
End: 2022-09-15
Payer: MEDICAID

## 2022-09-15 NOTE — TELEPHONE ENCOUNTER
Called and spoke to pt regarding smoking cessation progress and to see if she is ready to get back into the program.  Pt stated that she had to cancel some appointments due family issues.  Pt stated that she will call us back when she is ready to get back into the program.

## 2022-09-21 ENCOUNTER — HISTORICAL (OUTPATIENT)
Dept: ADMINISTRATIVE | Facility: HOSPITAL | Age: 57
End: 2022-09-21
Payer: MEDICAID

## 2022-12-02 ENCOUNTER — LAB VISIT (OUTPATIENT)
Dept: LAB | Facility: HOSPITAL | Age: 57
End: 2022-12-02
Attending: NURSE PRACTITIONER
Payer: MEDICAID

## 2022-12-02 DIAGNOSIS — E78.5 HYPERLIPIDEMIA LDL GOAL <100: ICD-10-CM

## 2022-12-02 LAB
ALBUMIN SERPL-MCNC: 4.3 GM/DL (ref 3.5–5)
ALBUMIN/GLOB SERPL: 1.4 RATIO (ref 1.1–2)
ALP SERPL-CCNC: 51 UNIT/L (ref 40–150)
ALT SERPL-CCNC: 14 UNIT/L (ref 0–55)
AST SERPL-CCNC: 17 UNIT/L (ref 5–34)
BILIRUBIN DIRECT+TOT PNL SERPL-MCNC: 0.4 MG/DL
BUN SERPL-MCNC: 16.6 MG/DL (ref 9.8–20.1)
CALCIUM SERPL-MCNC: 10 MG/DL (ref 8.4–10.2)
CHLORIDE SERPL-SCNC: 105 MMOL/L (ref 98–107)
CHOLEST SERPL-MCNC: 237 MG/DL
CHOLEST/HDLC SERPL: 4 {RATIO} (ref 0–5)
CO2 SERPL-SCNC: 26 MMOL/L (ref 22–29)
CREAT SERPL-MCNC: 0.85 MG/DL (ref 0.55–1.02)
GFR SERPLBLD CREATININE-BSD FMLA CKD-EPI: >60 MLS/MIN/1.73/M2
GLOBULIN SER-MCNC: 3.1 GM/DL (ref 2.4–3.5)
GLUCOSE SERPL-MCNC: 84 MG/DL (ref 74–100)
HDLC SERPL-MCNC: 61 MG/DL (ref 35–60)
LDLC SERPL CALC-MCNC: 144 MG/DL (ref 50–140)
POTASSIUM SERPL-SCNC: 4.5 MMOL/L (ref 3.5–5.1)
PROT SERPL-MCNC: 7.4 GM/DL (ref 6.4–8.3)
SODIUM SERPL-SCNC: 140 MMOL/L (ref 136–145)
TRIGL SERPL-MCNC: 159 MG/DL (ref 37–140)
VLDLC SERPL CALC-MCNC: 32 MG/DL

## 2022-12-02 PROCEDURE — 80061 LIPID PANEL: CPT

## 2022-12-02 PROCEDURE — 80053 COMPREHEN METABOLIC PANEL: CPT

## 2022-12-02 PROCEDURE — 36415 COLL VENOUS BLD VENIPUNCTURE: CPT

## 2022-12-05 ENCOUNTER — OFFICE VISIT (OUTPATIENT)
Dept: CARDIOLOGY | Facility: CLINIC | Age: 57
End: 2022-12-05
Payer: MEDICAID

## 2022-12-05 VITALS
WEIGHT: 154.13 LBS | BODY MASS INDEX: 28.36 KG/M2 | SYSTOLIC BLOOD PRESSURE: 111 MMHG | OXYGEN SATURATION: 98 % | RESPIRATION RATE: 18 BRPM | HEART RATE: 77 BPM | HEIGHT: 62 IN | TEMPERATURE: 98 F | DIASTOLIC BLOOD PRESSURE: 75 MMHG

## 2022-12-05 DIAGNOSIS — E78.5 HYPERLIPIDEMIA LDL GOAL <100: ICD-10-CM

## 2022-12-05 DIAGNOSIS — R06.09 DOE (DYSPNEA ON EXERTION): ICD-10-CM

## 2022-12-05 DIAGNOSIS — Z72.0 TOBACCO USE: ICD-10-CM

## 2022-12-05 DIAGNOSIS — R07.89 OTHER CHEST PAIN: Primary | ICD-10-CM

## 2022-12-05 PROCEDURE — 99213 OFFICE O/P EST LOW 20 MIN: CPT | Mod: S$PBB,,, | Performed by: NURSE PRACTITIONER

## 2022-12-05 PROCEDURE — 3074F SYST BP LT 130 MM HG: CPT | Mod: CPTII,,, | Performed by: NURSE PRACTITIONER

## 2022-12-05 PROCEDURE — 3074F PR MOST RECENT SYSTOLIC BLOOD PRESSURE < 130 MM HG: ICD-10-PCS | Mod: CPTII,,, | Performed by: NURSE PRACTITIONER

## 2022-12-05 PROCEDURE — 99213 PR OFFICE/OUTPT VISIT, EST, LEVL III, 20-29 MIN: ICD-10-PCS | Mod: S$PBB,,, | Performed by: NURSE PRACTITIONER

## 2022-12-05 PROCEDURE — 1160F RVW MEDS BY RX/DR IN RCRD: CPT | Mod: CPTII,,, | Performed by: NURSE PRACTITIONER

## 2022-12-05 PROCEDURE — 3008F PR BODY MASS INDEX (BMI) DOCUMENTED: ICD-10-PCS | Mod: CPTII,,, | Performed by: NURSE PRACTITIONER

## 2022-12-05 PROCEDURE — 1159F PR MEDICATION LIST DOCUMENTED IN MEDICAL RECORD: ICD-10-PCS | Mod: CPTII,,, | Performed by: NURSE PRACTITIONER

## 2022-12-05 PROCEDURE — 3078F DIAST BP <80 MM HG: CPT | Mod: CPTII,,, | Performed by: NURSE PRACTITIONER

## 2022-12-05 PROCEDURE — 1159F MED LIST DOCD IN RCRD: CPT | Mod: CPTII,,, | Performed by: NURSE PRACTITIONER

## 2022-12-05 PROCEDURE — 93005 ELECTROCARDIOGRAM TRACING: CPT

## 2022-12-05 PROCEDURE — 3078F PR MOST RECENT DIASTOLIC BLOOD PRESSURE < 80 MM HG: ICD-10-PCS | Mod: CPTII,,, | Performed by: NURSE PRACTITIONER

## 2022-12-05 PROCEDURE — 3008F BODY MASS INDEX DOCD: CPT | Mod: CPTII,,, | Performed by: NURSE PRACTITIONER

## 2022-12-05 PROCEDURE — 99214 OFFICE O/P EST MOD 30 MIN: CPT | Mod: PBBFAC | Performed by: NURSE PRACTITIONER

## 2022-12-05 PROCEDURE — 1160F PR REVIEW ALL MEDS BY PRESCRIBER/CLIN PHARMACIST DOCUMENTED: ICD-10-PCS | Mod: CPTII,,, | Performed by: NURSE PRACTITIONER

## 2022-12-05 RX ORDER — DENOSUMAB 60 MG/ML
INJECTION SUBCUTANEOUS
COMMUNITY
Start: 2022-06-22 | End: 2023-03-02 | Stop reason: DRUGHIGH

## 2022-12-05 RX ORDER — ASPIRIN 325 MG
TABLET, DELAYED RELEASE (ENTERIC COATED) ORAL
COMMUNITY
Start: 2022-10-09

## 2022-12-05 NOTE — PROGRESS NOTES
CHIEF COMPLAINT:   Chief Complaint   Patient presents with    Follow-up     6 mos f/u w/labs sob w/exertion sometimes . Cp occassionally                     Review of patient's allergies indicates:   Allergen Reactions    Meperidine Nausea And Vomiting                                          HPI:  Rosa Ramsey 57 y.o. female with a past medical history of chronic tobacco use, cystitis, GERD, and atypical chest pain presents for 6 month follow up and ongoing care.  Patient completed an Exercise Echocardiogram in October 2021 which revealed no wall motion abnormalities. See full report below.  Patient is status post right knee surgery in November 2021. She completed an Echocardiogram in January 2021 which revealed an ejection fraction of approximately 50 to 55%.     Patient reports occasional episodes of chest pain that she describes as a mild tightness/pressure that occurs usually when she is stressed with family issues.  She states her last episode was a few weeks ago.  She denies use of sublingual nitroglycerin.  She reports only occasional episodes of shortness of breath with exertion.  She denies palpitations, orthopnea, PND, peripheral edema, or syncope.  She continues to smoke approximately 3-4 cigarettes per day, but has been referred to the smoking cessation program with plans to start back up in the program after the 1st of the year.  She reports compliance with her propranolol.      Exercise stress echocardiogram October 21, 2021:  Stress echocardiogram reveals no wall motion abnormalities with maximal stress.  Good chronotropic response.  Good prognosis treadmill stress test.  Good exercise tolerance. Patient is 56 years old, and exercised for 8 minutes and 45 seconds. Performed 10 METs.  Achieved 100% of maximum predicted heart rate.  No EKG changes on stress EKG.  No chest pain during the treadmill stress test.  Castro treadmill score is 8 (greater than 5 which indicates low risk).  No  significant changes compared to baseline.  No rhythm abnormality.  No cardiovascular symptoms with maximum exercise.  Summary:  No wall motion abnormalities seen on stress echocardiography.     Echocardiogram January 2021:  Left ventricular size is normal.  Left ventricular ejection fraction is measured at approximately 50 to 55%.  Trace mitral regurgitation.  Structurally aortic valve is trileaflet.  Trace tricuspid regurgitation with an RVSP of 17 mmHg.  Trace pulmonic regurgitation present.  Normal size left atrium.  Normal right atrial size.  Normal right ventricular size.  No evidence of a pericardial effusion.  No evidence of pleural effusion.  IVC is normal.    Exercise nuclear stress test January 2020:  Scan suggest: No ischemia  No scar  Ejection fraction: 97%  No wall motion abnormality    Echocardiogram June 2, 2017: LVEF estimated at 59%  E/A flow reversal noted. Suggestive of diastolic dysfunction  Normal right ventricular size with preserved RV function  Structurally normal aortic valve and excellent structurally normal mitral valve  No evidence of any pericardial effusion                                                                                                                                                                                           Patient Active Problem List   Diagnosis    Other chest pain    Hyperlipidemia LDL goal <100    Tobacco use    Interstitial cystitis    Overactive bladder     Past Surgical History:   Procedure Laterality Date    PARTIAL HYSTERECTOMY      right knee surgery       Social History     Socioeconomic History    Marital status: Single   Tobacco Use    Smoking status: Every Day     Packs/day: 0.25     Years: 25.00     Pack years: 6.25     Types: Cigarettes    Smokeless tobacco: Never    Tobacco comments:     3/day   Substance and Sexual Activity    Alcohol use: Yes    Drug use: Never    Sexual activity: Not Currently     Birth control/protection: None       "  Family History   Problem Relation Age of Onset    Asthma Mother     Hyperlipidemia Mother     Hypertension Mother     Lung cancer Father          Current Outpatient Medications:     amitriptyline (ELAVIL) 25 MG tablet, amitriptyline 25 mg tablet, Disp: , Rfl:     aspirin 81 MG Chew, Take 81 mg by mouth once daily., Disp: , Rfl:     cholecalciferol, vitamin D3, 1,250 mcg (50,000 unit) capsule, , Disp: , Rfl:     meloxicam (MOBIC) 15 MG tablet, Take 15 mg by mouth once daily., Disp: , Rfl:     methocarbamoL (ROBAXIN) 750 MG Tab, Take 1,500 mg by mouth 3 (three) times daily., Disp: , Rfl:     montelukast (SINGULAIR) 10 mg tablet, Take 10 mg by mouth every evening., Disp: , Rfl:     nicotine, polacrilex, (NICORETTE) 2 mg Gum, Take 1 each (2 mg total) by mouth as needed (Do not exceed more than 10 pieces in 24 hours)., Disp: 190 each, Rfl: 0    nitroGLYCERIN (NITROSTAT) 0.4 MG SL tablet, Place 0.4 mg under the tongue as needed., Disp: , Rfl:     PROLIA 60 mg/mL Syrg, INJECT 60MG UNDER THE SKIN EVERY 6 MONTHS, Disp: , Rfl:     propranoloL (INDERAL) 10 MG tablet, Take 2 tablets (20 mg total) by mouth 3 (three) times daily., Disp: 180 tablet, Rfl: 11    tolterodine (DETROL) 2 MG Tab, Take 1 tablet (2 mg total) by mouth 2 (two) times daily., Disp: 60 tablet, Rfl: 11     ROS:                                                                                                                                                                             Review of Systems   Constitutional: Negative.    Respiratory:  Positive for shortness of breath.    Cardiovascular:  Positive for chest pain.   Gastrointestinal: Negative.    Musculoskeletal: Negative.    Skin: Negative.    Neurological: Negative.    Psychiatric/Behavioral: Negative.        Blood pressure 111/75, pulse 77, temperature 97.9 °F (36.6 °C), resp. rate 18, height 5' 2" (1.575 m), weight 69.9 kg (154 lb 1.6 oz), SpO2 98 %.   PE:  Physical Exam  Constitutional:       " Appearance: Normal appearance.   HENT:      Head: Normocephalic and atraumatic.   Eyes:      Extraocular Movements: Extraocular movements intact.      Pupils: Pupils are equal, round, and reactive to light.   Cardiovascular:      Rate and Rhythm: Normal rate and regular rhythm.   Pulmonary:      Effort: Pulmonary effort is normal.      Breath sounds: Normal breath sounds.   Abdominal:      Palpations: Abdomen is soft.   Musculoskeletal:         General: Normal range of motion.      Cervical back: Normal range of motion. No tenderness.   Skin:     General: Skin is warm and dry.   Neurological:      General: No focal deficit present.      Mental Status: She is alert and oriented to person, place, and time.   Psychiatric:         Mood and Affect: Mood normal.         Behavior: Behavior normal.       ASSESSMENT/PLAN:  Chest pain  She continues to report occasional episodes of CP  - mild tightness/pressure when she's stressed denies use of SL Nitro  Exercise Stress Echocardiogram Oct. 2021 - No wall motion abnormalities seen on stress echocardiography  Continue Propranolol as directed  Instructed to take SL Nitro prn CP    Diastolic dysfunction per Echo June 2017  EF of 50-55% per Echo Jan. 2021   Reports occasional ISLAS     GERD  Management per PCP    HLD  LDL not at goal - 144  Patient recently initiated on Niacin by her PCP, but has not yet started  Counseled on low cholesterol diet and exercise to get LDL to goal of < 100      Chronic tobacco use  Counseled on the importance of smoking cessation - she smokes about 3-4 cigarettes per day  Patient is ready to quit and will reach back out to the smoking cessation program after the first of the year    EKG today  Follow up in [facility]iology [facility] in 3 months  Follow up with PCP as directed

## 2022-12-07 ENCOUNTER — HOSPITAL ENCOUNTER (OUTPATIENT)
Dept: RADIOLOGY | Facility: HOSPITAL | Age: 57
Discharge: HOME | End: 2022-12-07
Attending: NURSE PRACTITIONER
Payer: MEDICAID

## 2022-12-07 DIAGNOSIS — Z12.31 SCREENING MAMMOGRAM FOR BREAST CANCER: ICD-10-CM

## 2022-12-07 PROCEDURE — 77063 BREAST TOMOSYNTHESIS BI: CPT | Mod: 26,,, | Performed by: RADIOLOGY

## 2022-12-07 PROCEDURE — 77063 MAMMO DIGITAL SCREENING BILAT WITH TOMO: ICD-10-PCS | Mod: 26,,, | Performed by: RADIOLOGY

## 2022-12-07 PROCEDURE — 77067 SCR MAMMO BI INCL CAD: CPT | Mod: 26,,, | Performed by: RADIOLOGY

## 2022-12-07 PROCEDURE — 77063 BREAST TOMOSYNTHESIS BI: CPT | Mod: TC

## 2022-12-07 PROCEDURE — 77067 MAMMO DIGITAL SCREENING BILAT WITH TOMO: ICD-10-PCS | Mod: 26,,, | Performed by: RADIOLOGY

## 2023-01-26 ENCOUNTER — OFFICE VISIT (OUTPATIENT)
Dept: UROLOGY | Facility: CLINIC | Age: 58
End: 2023-01-26
Payer: MEDICAID

## 2023-01-26 VITALS
RESPIRATION RATE: 20 BRPM | BODY MASS INDEX: 27.99 KG/M2 | OXYGEN SATURATION: 98 % | SYSTOLIC BLOOD PRESSURE: 90 MMHG | HEIGHT: 62 IN | DIASTOLIC BLOOD PRESSURE: 61 MMHG | TEMPERATURE: 98 F | HEART RATE: 80 BPM | WEIGHT: 152.13 LBS

## 2023-01-26 DIAGNOSIS — N30.10 INTERSTITIAL CYSTITIS: ICD-10-CM

## 2023-01-26 DIAGNOSIS — N32.81 OVERACTIVE BLADDER: Primary | ICD-10-CM

## 2023-01-26 PROCEDURE — 1160F PR REVIEW ALL MEDS BY PRESCRIBER/CLIN PHARMACIST DOCUMENTED: ICD-10-PCS | Mod: CPTII,,, | Performed by: NURSE PRACTITIONER

## 2023-01-26 PROCEDURE — 1159F PR MEDICATION LIST DOCUMENTED IN MEDICAL RECORD: ICD-10-PCS | Mod: CPTII,,, | Performed by: NURSE PRACTITIONER

## 2023-01-26 PROCEDURE — 3008F BODY MASS INDEX DOCD: CPT | Mod: CPTII,,, | Performed by: NURSE PRACTITIONER

## 2023-01-26 PROCEDURE — 3074F SYST BP LT 130 MM HG: CPT | Mod: CPTII,,, | Performed by: NURSE PRACTITIONER

## 2023-01-26 PROCEDURE — 1159F MED LIST DOCD IN RCRD: CPT | Mod: CPTII,,, | Performed by: NURSE PRACTITIONER

## 2023-01-26 PROCEDURE — 1160F RVW MEDS BY RX/DR IN RCRD: CPT | Mod: CPTII,,, | Performed by: NURSE PRACTITIONER

## 2023-01-26 PROCEDURE — 99213 OFFICE O/P EST LOW 20 MIN: CPT | Mod: S$PBB,,, | Performed by: NURSE PRACTITIONER

## 2023-01-26 PROCEDURE — 3008F PR BODY MASS INDEX (BMI) DOCUMENTED: ICD-10-PCS | Mod: CPTII,,, | Performed by: NURSE PRACTITIONER

## 2023-01-26 PROCEDURE — 3078F PR MOST RECENT DIASTOLIC BLOOD PRESSURE < 80 MM HG: ICD-10-PCS | Mod: CPTII,,, | Performed by: NURSE PRACTITIONER

## 2023-01-26 PROCEDURE — 3078F DIAST BP <80 MM HG: CPT | Mod: CPTII,,, | Performed by: NURSE PRACTITIONER

## 2023-01-26 PROCEDURE — 3074F PR MOST RECENT SYSTOLIC BLOOD PRESSURE < 130 MM HG: ICD-10-PCS | Mod: CPTII,,, | Performed by: NURSE PRACTITIONER

## 2023-01-26 PROCEDURE — 99214 OFFICE O/P EST MOD 30 MIN: CPT | Mod: PBBFAC | Performed by: NURSE PRACTITIONER

## 2023-01-26 PROCEDURE — 99213 PR OFFICE/OUTPT VISIT, EST, LEVL III, 20-29 MIN: ICD-10-PCS | Mod: S$PBB,,, | Performed by: NURSE PRACTITIONER

## 2023-01-26 RX ORDER — TOLTERODINE 4 MG/1
4 CAPSULE, EXTENDED RELEASE ORAL DAILY
Qty: 90 CAPSULE | Refills: 3 | Status: SHIPPED | OUTPATIENT
Start: 2023-01-26 | End: 2023-03-02 | Stop reason: DRUGHIGH

## 2023-01-26 NOTE — PROGRESS NOTES
Chief Complaint:   Chief Complaint   Patient presents with    Urinary Frequency       HPI:   Patient is a 56-year-old female here for a 6 month F/U urinary frequency.  Patient seen by OBGYN on 06/14/2022 with complaints of urinary frequency patient placed on Detrol generic 2 mg, Elmiron for IC.  Patient's last visit presented without dysuria, urinary urgency, frequency, incontinence, retention, gross hematuria, patient does have mild nocturia 3 times a night but admits to drinking prior to bedtime and throughout the night. Instructed patient to continue Elmiron, patient has decided to continue Detrol 2 mg b.i.d..  Today patient presents without dysuria, urinary urgency, urinary frequency, incontinence, retention, gross hematuria.  Patient however did try to get off Elmiron but had to restart due to recurrent symptoms.    Allergies:  Review of patient's allergies indicates:   Allergen Reactions    Meperidine Nausea And Vomiting       Medications:  Current Outpatient Medications   Medication Sig Dispense Refill    amitriptyline (ELAVIL) 25 MG tablet amitriptyline 25 mg tablet      aspirin 81 MG Chew Take 81 mg by mouth once daily.      cholecalciferol, vitamin D3, 1,250 mcg (50,000 unit) capsule       meloxicam (MOBIC) 15 MG tablet Take 15 mg by mouth once daily.      methocarbamoL (ROBAXIN) 750 MG Tab Take 1,500 mg by mouth 3 (three) times daily.      montelukast (SINGULAIR) 10 mg tablet Take 10 mg by mouth every evening.      nicotine, polacrilex, (NICORETTE) 2 mg Gum Take 1 each (2 mg total) by mouth as needed (Do not exceed more than 10 pieces in 24 hours). 190 each 0    nitroGLYCERIN (NITROSTAT) 0.4 MG SL tablet Place 0.4 mg under the tongue as needed.      pentosan polysulfate (ELMIRON) 100 mg Cap Take 100 mg by mouth 3 (three) times daily.      PROLIA 60 mg/mL Syrg INJECT 60MG UNDER THE SKIN EVERY 6 MONTHS      propranoloL (INDERAL) 10 MG tablet Take 2 tablets (20 mg total) by mouth 3 (three) times daily. 180  tablet 11    tolterodine (DETROL) 2 MG Tab Take 1 tablet (2 mg total) by mouth 2 (two) times daily. 60 tablet 11     No current facility-administered medications for this visit.       Review of Systems:  General: No fever, chills, fatigability, or weight loss.  Skin: No rashes, itching, or changes in color or texture of skin.  Chest: Denies ISLAS, cyanosis, wheezing, cough, and sputum production.  Abdomen: Appetite fine. No weight loss. Denies diarrhea, abdominal pain, hematemesis, or blood in stool.  Musculoskeletal: No joint stiffness or swelling. Denies back pain.  : As above.  All other review of systems negative.    PMH:  Past Medical History:   Diagnosis Date    Acid reflux     Arthritis     H/O arthroscopic knee surgery     History of esophagogastroduodenoscopy (EGD)     History of partial hysterectomy     Hx of colonoscopy     Neuropathy     Osteopenia     Osteoporosis     Smoker        PSH:  Past Surgical History:   Procedure Laterality Date    PARTIAL HYSTERECTOMY      right knee surgery         FamHx:  Family History   Problem Relation Age of Onset    Asthma Mother     Hyperlipidemia Mother     Hypertension Mother     Lung cancer Father        SocHx:  Social History     Socioeconomic History    Marital status: Single   Tobacco Use    Smoking status: Every Day     Packs/day: 0.25     Years: 25.00     Pack years: 6.25     Types: Cigarettes     Passive exposure: Past    Smokeless tobacco: Never    Tobacco comments:     3/day   Substance and Sexual Activity    Alcohol use: Yes    Drug use: Never    Sexual activity: Not Currently     Birth control/protection: None       Physical Exam:  Vitals:    01/26/23 1237   BP: 90/61   Pulse: 80   Resp: 20   Temp: 98.1 °F (36.7 °C)     General: A&Ox3, no apparent distress, no deformities  Neck: No masses, normal thyroid  Lungs: CTA shahrzad, no use of accessory muscles  Heart: RRR, no arrhythmias  Abdomen: Soft, NT, ND, no masses, no hernias, no hepatosplenomegaly  Lymphatic:  Neck and groin nodes negative  Skin: The skin is warm and dry. No jaundice.  Ext: No c/c/e.        Impression:  Overactive bladder      Plan: Instructed patient continue Remeron, changed to Detrol LA 4 mg p.o. daily.  Reinforced patient on timed voiding every 2-3 hrs, double voiding, avoidance of bladder irritants such as alcohol, citrus foods, chocolate, caffeinated drinks, energy drinks, spicy foods, sugar, caffeine products, sodas. Instructed patient to avoid drinking fluids 1-2 hours prior to bedtime & void immediately before bedtime.

## 2023-02-10 ENCOUNTER — TELEPHONE (OUTPATIENT)
Dept: CARDIOLOGY | Facility: CLINIC | Age: 58
End: 2023-02-10
Payer: MEDICAID

## 2023-02-10 NOTE — TELEPHONE ENCOUNTER
Spoke with pt concerning chest pain. Pt states she's having CP 3-4 times daily and it's resolved with a Aspirin and rest. Pt states she did not have any CP today. CP occurs with activity like doing dishes and while driving. Pt states she has been under a lot of stress lately with a Down Syndrome son. Pt states she's not taking NTG because someone told her it causes bad headaches. Instructed pt to take NTG as directed and refer to ED. Informed pt ANDREI Alves NP will be notified. Will notify pt of POC once advised.         Message from Lennie Hurley sent at 2/10/2023 10:35 AM CST -----  Pt called this morning wanting to leave a message that she is having chest pain more frequently, but it goes away before she is having to take a nitro.  She is concerned about why her chest pain is coming as frequent as it is.  Her number is [phone].    Thank you,

## 2023-02-15 ENCOUNTER — TELEPHONE (OUTPATIENT)
Dept: CARDIOLOGY | Facility: CLINIC | Age: 58
End: 2023-02-15
Payer: MEDICAID

## 2023-02-15 NOTE — TELEPHONE ENCOUNTER
Patient called to follow up on a message left for Patsy Alves about frequent chest pain. Informed patient that Patsy Alves NP reviewed message yesterday, Her instructions were to continue to take SL nitro as directed, report to the ER for frequent or worsening chest pain and schedule a follow up appointment in aproximately 2 weeks. Informed patient of all recommendations. Appointment scheduled for 03/02/23 at 0845 with Patsy Alves NP. Patient verbalized understanding.

## 2023-03-02 ENCOUNTER — OFFICE VISIT (OUTPATIENT)
Dept: CARDIOLOGY | Facility: CLINIC | Age: 58
End: 2023-03-02
Payer: MEDICAID

## 2023-03-02 VITALS
HEART RATE: 68 BPM | RESPIRATION RATE: 20 BRPM | OXYGEN SATURATION: 100 % | SYSTOLIC BLOOD PRESSURE: 117 MMHG | HEIGHT: 63 IN | WEIGHT: 154.31 LBS | DIASTOLIC BLOOD PRESSURE: 78 MMHG | BODY MASS INDEX: 27.34 KG/M2

## 2023-03-02 DIAGNOSIS — R07.89 OTHER CHEST PAIN: Primary | ICD-10-CM

## 2023-03-02 DIAGNOSIS — R06.09 DOE (DYSPNEA ON EXERTION): ICD-10-CM

## 2023-03-02 DIAGNOSIS — E78.5 HYPERLIPIDEMIA LDL GOAL <100: ICD-10-CM

## 2023-03-02 DIAGNOSIS — R00.2 PALPITATIONS: ICD-10-CM

## 2023-03-02 DIAGNOSIS — Z72.0 TOBACCO USE: ICD-10-CM

## 2023-03-02 PROCEDURE — 99214 PR OFFICE/OUTPT VISIT, EST, LEVL IV, 30-39 MIN: ICD-10-PCS | Mod: S$PBB,,, | Performed by: NURSE PRACTITIONER

## 2023-03-02 PROCEDURE — 3078F DIAST BP <80 MM HG: CPT | Mod: CPTII,,, | Performed by: NURSE PRACTITIONER

## 2023-03-02 PROCEDURE — 3008F BODY MASS INDEX DOCD: CPT | Mod: CPTII,,, | Performed by: NURSE PRACTITIONER

## 2023-03-02 PROCEDURE — 3074F PR MOST RECENT SYSTOLIC BLOOD PRESSURE < 130 MM HG: ICD-10-PCS | Mod: CPTII,,, | Performed by: NURSE PRACTITIONER

## 2023-03-02 PROCEDURE — 3008F PR BODY MASS INDEX (BMI) DOCUMENTED: ICD-10-PCS | Mod: CPTII,,, | Performed by: NURSE PRACTITIONER

## 2023-03-02 PROCEDURE — 3074F SYST BP LT 130 MM HG: CPT | Mod: CPTII,,, | Performed by: NURSE PRACTITIONER

## 2023-03-02 PROCEDURE — 99214 OFFICE O/P EST MOD 30 MIN: CPT | Mod: S$PBB,,, | Performed by: NURSE PRACTITIONER

## 2023-03-02 PROCEDURE — 93005 ELECTROCARDIOGRAM TRACING: CPT

## 2023-03-02 PROCEDURE — 99214 OFFICE O/P EST MOD 30 MIN: CPT | Mod: PBBFAC | Performed by: NURSE PRACTITIONER

## 2023-03-02 PROCEDURE — 1160F PR REVIEW ALL MEDS BY PRESCRIBER/CLIN PHARMACIST DOCUMENTED: ICD-10-PCS | Mod: CPTII,,, | Performed by: NURSE PRACTITIONER

## 2023-03-02 PROCEDURE — 3078F PR MOST RECENT DIASTOLIC BLOOD PRESSURE < 80 MM HG: ICD-10-PCS | Mod: CPTII,,, | Performed by: NURSE PRACTITIONER

## 2023-03-02 PROCEDURE — 1160F RVW MEDS BY RX/DR IN RCRD: CPT | Mod: CPTII,,, | Performed by: NURSE PRACTITIONER

## 2023-03-02 PROCEDURE — 1159F PR MEDICATION LIST DOCUMENTED IN MEDICAL RECORD: ICD-10-PCS | Mod: CPTII,,, | Performed by: NURSE PRACTITIONER

## 2023-03-02 PROCEDURE — 1159F MED LIST DOCD IN RCRD: CPT | Mod: CPTII,,, | Performed by: NURSE PRACTITIONER

## 2023-03-02 RX ORDER — LEUCOVORIN, FOLIC ACID, LEVOMEFOLATE MAGNESIUM, FERROUS CYSTEINE GLYCINATE, 1,2-DOCOSAHEXANOYL-SN-GLYCERO-3-PHOSPHOSERINE CALCIUM, 1,2-ICOSAPENTOYL-SN-GLYCERO-3-PHOSPHOSERINE CALCIUM, PHOSPHATIDYL SERINE, PYRIDOXAL 5-PHOSPHATE, FLAVIN ADENINE DINUCLEOTIDE, NADH, COBAMAMIDE, COCARBOXYLASE (THIAMINE PYROPHOSPHATE), MAGNESIUM ASCORBATE, ZINC ASCORBATE, MAGNESIUM L-THREONATE AND BETAINE 2.5; 1; 7; 13.6; 6.4; 800; 12; 25; 25; 25; 50; 25; 24; 1; 1; 500; 1.83; 3.67 MG/1; MG/1; MG/1; MG/1; MG/1; UG/1; MG/1; UG/1; UG/1; UG/1; UG/1; UG/1; MG/1; MG/1; MG/1; UG/1; MG/1; MG/1
1 CAPSULE, DELAYED RELEASE PELLETS ORAL
COMMUNITY
Start: 2022-09-19

## 2023-03-02 RX ORDER — PENTOSAN POLYSULFATE SODIUM 100 MG/1
CAPSULE, GELATIN COATED ORAL
COMMUNITY
End: 2024-02-01 | Stop reason: SDUPTHER

## 2023-03-02 RX ORDER — AMITRIPTYLINE HYDROCHLORIDE 10 MG/1
10 TABLET, FILM COATED ORAL NIGHTLY
COMMUNITY
Start: 2023-01-12

## 2023-03-02 RX ORDER — DICLOFENAC SODIUM 10 MG/G
GEL TOPICAL
COMMUNITY
Start: 2022-09-19

## 2023-03-02 RX ORDER — NIACIN 500 MG/1
TABLET, EXTENDED RELEASE ORAL
COMMUNITY
Start: 2022-09-19

## 2023-03-02 RX ORDER — ZOLPIDEM TARTRATE 5 MG/1
5 TABLET ORAL NIGHTLY PRN
COMMUNITY
Start: 2022-12-13

## 2023-03-02 RX ORDER — OXYBUTYNIN CHLORIDE 5 MG/1
TABLET, EXTENDED RELEASE ORAL
COMMUNITY
End: 2024-02-01 | Stop reason: SDUPTHER

## 2023-03-02 RX ORDER — ALENDRONATE SODIUM 70 MG/1
70 TABLET ORAL
COMMUNITY
Start: 2023-02-06

## 2023-03-02 NOTE — PROGRESS NOTES
CHIEF COMPLAINT:   Chief Complaint   Patient presents with    Follow-up     Had frequent chest pain 2 weeks ago, it has improved, did not take NTG. Each episode lasted 1-2 minutes, Occasional SOB. ADL's unchanged.                       Review of patient's allergies indicates:   Allergen Reactions    Meperidine Nausea And Vomiting                                          HPI:  Rosa Ramsey 57 y.o. female with a past medical history of chronic tobacco use, cystitis, GERD, and atypical chest pain presents for 3 month follow up and ongoing care.  Patient completed an Exercise Echocardiogram in October 2021 which revealed no wall motion abnormalities. See full report below.  Patient is status post right knee surgery in November 2021. She completed an Echocardiogram in January 2021 which revealed an ejection fraction of approximately 50 to 55%.     Patient continues to report occasional chest pain that she describes as a pressure that she notices when she is really stressed.  She denies use of sublingual nitroglycerin.  She reports occasional shortness of breath with exertion.  She also reports daily palpitations in the mornings when she wakes up.  She continues to smokes approximately 4 cigarettes per day, but has been referred to the smoking cessation program in the past.  Patient states she has not been taking propranolol t.i.d. as previously directed by her PCP.      Exercise stress echocardiogram October 21, 2021:  Stress echocardiogram reveals no wall motion abnormalities with maximal stress.  Good chronotropic response.  Good prognosis treadmill stress test.  Good exercise tolerance. Patient is 56 years old, and exercised for 8 minutes and 45 seconds. Performed 10 METs.  Achieved 100% of maximum predicted heart rate.  No EKG changes on stress EKG.  No chest pain during the treadmill stress test.  Castro treadmill score is 8 (greater than 5 which indicates low risk).  No significant changes compared to  baseline.  No rhythm abnormality.  No cardiovascular symptoms with maximum exercise.  Summary:  No wall motion abnormalities seen on stress echocardiography.     Echocardiogram January 2021:  Left ventricular size is normal.  Left ventricular ejection fraction is measured at approximately 50 to 55%.  Trace mitral regurgitation.  Structurally aortic valve is trileaflet.  Trace tricuspid regurgitation with an RVSP of 17 mmHg.  Trace pulmonic regurgitation present.  Normal size left atrium.  Normal right atrial size.  Normal right ventricular size.  No evidence of a pericardial effusion.  No evidence of pleural effusion.  IVC is normal.    Exercise nuclear stress test January 2020:  Scan suggest: No ischemia  No scar  Ejection fraction: 97%  No wall motion abnormality    Echocardiogram June 2, 2017: LVEF estimated at 59%  E/A flow reversal noted. Suggestive of diastolic dysfunction  Normal right ventricular size with preserved RV function  Structurally normal aortic valve and excellent structurally normal mitral valve  No evidence of any pericardial effusion                                                                                                                                                                                           Patient Active Problem List   Diagnosis    Other chest pain    Hyperlipidemia LDL goal <100    Tobacco use    Interstitial cystitis    Overactive bladder    ISLAS (dyspnea on exertion)     Past Surgical History:   Procedure Laterality Date    PARTIAL HYSTERECTOMY      right knee surgery       Social History     Socioeconomic History    Marital status: Single   Tobacco Use    Smoking status: Every Day     Packs/day: 0.25     Years: 25.00     Pack years: 6.25     Types: Cigarettes     Passive exposure: Past    Smokeless tobacco: Never    Tobacco comments:     3/day   Substance and Sexual Activity    Alcohol use: Yes    Drug use: Never    Sexual activity: Not Currently     Birth  control/protection: None        Family History   Problem Relation Age of Onset    Asthma Mother     Hyperlipidemia Mother     Hypertension Mother     Lung cancer Father          Current Outpatient Medications:     aspirin 81 MG Chew, Take 81 mg by mouth once daily., Disp: , Rfl:     cholecalciferol, vitamin D3, 1,250 mcg (50,000 unit) capsule, , Disp: , Rfl:     meloxicam (MOBIC) 15 MG tablet, Take 15 mg by mouth once daily., Disp: , Rfl:     methocarbamoL (ROBAXIN) 750 MG Tab, Take 1,500 mg by mouth 3 (three) times daily., Disp: , Rfl:     montelukast (SINGULAIR) 10 mg tablet, Take 10 mg by mouth every evening., Disp: , Rfl:     nicotine, polacrilex, (NICORETTE) 2 mg Gum, Take 1 each (2 mg total) by mouth as needed (Do not exceed more than 10 pieces in 24 hours)., Disp: 190 each, Rfl: 0    propranoloL (INDERAL) 10 MG tablet, Take 2 tablets (20 mg total) by mouth 3 (three) times daily., Disp: 180 tablet, Rfl: 11    alendronate (FOSAMAX) 70 MG tablet, Take 70 mg by mouth every 7 days., Disp: , Rfl:     amitriptyline (ELAVIL) 10 MG tablet, Take 10 mg by mouth every evening., Disp: , Rfl:     diclofenac sodium (VOLTAREN) 1 % Gel, , Disp: , Rfl:     ENLYTE 1.5 mg iron- 8.73 mg CpID, Take 1 capsule by mouth. for thirty days, Disp: , Rfl:     niacin (SLO-NIACIN) 500 mg tablet, , Disp: , Rfl:     nitroGLYCERIN (NITROSTAT) 0.4 MG SL tablet, Place 0.4 mg under the tongue as needed., Disp: , Rfl:     oxybutynin (DITROPAN-XL) 5 MG TR24, oxybutynin chloride ER 5 mg tablet,extended release 24 hr  Take 1 tablet every day by oral route for 30 days., Disp: , Rfl:     pentosan polysulfate (ELMIRON) 100 mg Cap, Elmiron 100 mg capsule  TAKE 1 CAPSULE BY MOUTH THREE TIMES DAILY FOR 10 DAYS, Disp: , Rfl:     zolpidem (AMBIEN) 5 MG Tab, Take 5 mg by mouth nightly as needed., Disp: , Rfl:      ROS:                                                                                                                                                 "                             Review of Systems   Constitutional: Negative.    Respiratory:  Positive for shortness of breath.    Cardiovascular:  Positive for chest pain and palpitations.   Gastrointestinal: Negative.    Musculoskeletal: Negative.    Skin: Negative.    Neurological: Negative.    Psychiatric/Behavioral: Negative.        Blood pressure 117/78, pulse 68, resp. rate 20, height 5' 2.99" (1.6 m), weight 70 kg (154 lb 5.2 oz), SpO2 100 %.   PE:  Physical Exam  Constitutional:       Appearance: Normal appearance.   HENT:      Head: Normocephalic and atraumatic.   Eyes:      Extraocular Movements: Extraocular movements intact.      Pupils: Pupils are equal, round, and reactive to light.   Cardiovascular:      Rate and Rhythm: Normal rate and regular rhythm.   Pulmonary:      Effort: Pulmonary effort is normal.      Breath sounds: Normal breath sounds.   Abdominal:      Palpations: Abdomen is soft.   Musculoskeletal:         General: Normal range of motion.      Cervical back: Normal range of motion.   Skin:     General: Skin is warm and dry.   Neurological:      General: No focal deficit present.      Mental Status: She is alert and oriented to person, place, and time.   Psychiatric:         Mood and Affect: Mood normal.         Behavior: Behavior normal.       ASSESSMENT/PLAN:  Chest pain  Exercise Stress Echocardiogram Oct. 2021 - No wall motion abnormalities seen on stress echocardiography  Patient continues to report occasional chest pain that she describes as a pressure that she notices when she is really stressed.  She denies use of sublingual nitroglycerin.    Encouraged patient to take Propranolol as directed  Instructed to take SL Nitro prn CP    Palpitations  Will order 48 Hour Holter monitor    Diastolic dysfunction per Echo June 2017  EF of 50-55% per Echo Jan. 2021   Reports occasional ISLAS    GERD  Management per PCP    HLD  LDL not at goal - 144  Patient recently started Niacin prescribed by " her PCP  Will repeat FLP in 3 months  Counseled on low cholesterol diet and exercise to get LDL to goal of < 100      Chronic tobacco use  Counseled on the importance of smoking cessation - she smokes about 4 cigarettes per day  Patient referred to smoking cessation program in the past      EKG today  48 Hour Holter monitor  CMP, HARVEY in 3 months   Follow up in Cardiology Clinic in 3 months

## 2023-03-02 NOTE — PATIENT INSTRUCTIONS
EKG today  48 Hour Holter monitor  CMP, FLP in 3 months   Follow up in Cardiology Clinic in 3 months

## 2023-03-30 ENCOUNTER — HOSPITAL ENCOUNTER (OUTPATIENT)
Dept: CARDIOLOGY | Facility: HOSPITAL | Age: 58
Discharge: HOME | End: 2023-03-30
Attending: NURSE PRACTITIONER
Payer: MEDICAID

## 2023-03-30 DIAGNOSIS — R00.2 PALPITATIONS: ICD-10-CM

## 2023-03-30 PROCEDURE — 93226 XTRNL ECG REC<48 HR SCAN A/R: CPT

## 2023-04-03 LAB
OHS CV EVENT MONITOR DAY: 0
OHS CV HOLTER LENGTH DECIMAL HOURS: 48
OHS CV HOLTER LENGTH HOURS: 48
OHS CV HOLTER LENGTH MINUTES: 0
OHS CV HOLTER SINUS AVERAGE HR: 82
OHS CV HOLTER SINUS MAX HR: 113
OHS CV HOLTER SINUS MIN HR: 55

## 2023-06-09 NOTE — PROGRESS NOTES
CHIEF COMPLAINT:   Chief Complaint   Patient presents with    F/U with holter results.  Pt did not repeat labs.                      Review of patient's allergies indicates:   Allergen Reactions    Meperidine Nausea And Vomiting                                          HPI:  Rosa Ramsey 57 y.o. female with a past medical history of chronic tobacco use, cystitis, s/p right knee surgery (2021), GERD, and atypical chest pain who presents for routine follow up and results of testing.  At her last clinic visit, the patient endorsed daily palpitations and occasional episodes of chest pain.  She completed a subsequent 48 hour Holter monitor in 2023 that was benign, revealing underlying sinus rhythm with no significant arrhythmias noted.(See report below).  Patient completed an Exercise Echocardiogram in 2021 which revealed no wall motion abnormalities. (See full report below).  She completed an Echocardiogram in 2021 which revealed an ejection fraction of approximately 50 to 55%.     Today the patient reports that she feels well.  She endorses only one episode of transient palpitations that occurred since last seen.  She also notes significant improvement in her chest pain, citing one episode of transient chest pain since last clinic visit.  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  She denies any orthopnea, PND, peripheral edema, claudication symptoms or syncope.  She continues to smoke approximately 4 cigarettes per day but states that she is willing to quit with the help of the smoking cessation program.  She reports that she has not taken propranolol in over a year.    CARDIAC TESTIN hour Holter monitor 3.30.23  Underlying rhythm:   Sinus  Arrythmia:  No significant arrhythmia noted     Pauses:  No significant pause noted  Symptoms:  No diary returned    Events: No significant  patient marked event       Exercise stress echocardiogram October 21, 2021:  Stress echocardiogram reveals no wall motion abnormalities with maximal stress.  Good chronotropic response.  Good prognosis treadmill stress test.  Good exercise tolerance. Patient is 56 years old, and exercised for 8 minutes and 45 seconds. Performed 10 METs.  Achieved 100% of maximum predicted heart rate.  No EKG changes on stress EKG.  No chest pain during the treadmill stress test.  Castro treadmill score is 8 (greater than 5 which indicates low risk).  No significant changes compared to baseline.  No rhythm abnormality.  No cardiovascular symptoms with maximum exercise.  Summary:  No wall motion abnormalities seen on stress echocardiography.     Echocardiogram January 2021:  Left ventricular size is normal.  Left ventricular ejection fraction is measured at approximately 50 to 55%.  Trace mitral regurgitation.  Structurally aortic valve is trileaflet.  Trace tricuspid regurgitation with an RVSP of 17 mmHg.  Trace pulmonic regurgitation present.  Normal size left atrium.  Normal right atrial size.  Normal right ventricular size.  No evidence of a pericardial effusion.  No evidence of pleural effusion.  IVC is normal.    Exercise nuclear stress test January 2020:  Scan suggest: No ischemia  No scar  Ejection fraction: 97%  No wall motion abnormality    Echocardiogram June 2, 2017: LVEF estimated at 59%  E/A flow reversal noted. Suggestive of diastolic dysfunction  Normal right ventricular size with preserved RV function  Structurally normal aortic valve and excellent structurally normal mitral valve  No evidence of any pericardial effusion                                                                                                                                                                                           Patient Active Problem List   Diagnosis    Other chest pain    Hyperlipidemia LDL goal <100    Tobacco use     Interstitial cystitis    Overactive bladder    ISLAS (dyspnea on exertion)    Palpitations     Past Surgical History:   Procedure Laterality Date    PARTIAL HYSTERECTOMY      right knee surgery       Social History     Socioeconomic History    Marital status: Single   Tobacco Use    Smoking status: Every Day     Packs/day: 0.25     Years: 25.00     Pack years: 6.25     Types: Cigarettes     Passive exposure: Past    Smokeless tobacco: Never    Tobacco comments:     3/day   Substance and Sexual Activity    Alcohol use: Not Currently    Drug use: Never    Sexual activity: Not Currently     Birth control/protection: None        Family History   Problem Relation Age of Onset    Asthma Mother     Hyperlipidemia Mother     Hypertension Mother     Lung cancer Father          Current Outpatient Medications:     alendronate (FOSAMAX) 70 MG tablet, Take 70 mg by mouth every 7 days., Disp: , Rfl:     amitriptyline (ELAVIL) 10 MG tablet, Take 10 mg by mouth every evening., Disp: , Rfl:     aspirin 81 MG Chew, Take 81 mg by mouth once daily., Disp: , Rfl:     cholecalciferol, vitamin D3, 1,250 mcg (50,000 unit) capsule, , Disp: , Rfl:     diclofenac sodium (VOLTAREN) 1 % Gel, , Disp: , Rfl:     ENLYTE 1.5 mg iron- 8.73 mg CpID, Take 1 capsule by mouth. for thirty days, Disp: , Rfl:     meloxicam (MOBIC) 15 MG tablet, Take 15 mg by mouth once daily., Disp: , Rfl:     methocarbamoL (ROBAXIN) 750 MG Tab, Take 1,500 mg by mouth 3 (three) times daily., Disp: , Rfl:     montelukast (SINGULAIR) 10 mg tablet, Take 10 mg by mouth every evening., Disp: , Rfl:     niacin (SLO-NIACIN) 500 mg tablet, , Disp: , Rfl:     nitroGLYCERIN (NITROSTAT) 0.4 MG SL tablet, Place 0.4 mg under the tongue as needed., Disp: , Rfl:     oxybutynin (DITROPAN-XL) 5 MG TR24, oxybutynin chloride ER 5 mg tablet,extended release 24 hr  Take 1 tablet every day by oral route for 30 days., Disp: , Rfl:     pentosan polysulfate (ELMIRON) 100 mg Cap, Elmiron 100 mg  "capsule  TAKE 1 CAPSULE BY MOUTH THREE TIMES DAILY FOR 10 DAYS, Disp: , Rfl:     zolpidem (AMBIEN) 5 MG Tab, Take 5 mg by mouth nightly as needed., Disp: , Rfl:     nicotine, polacrilex, (NICORETTE) 2 mg Gum, Take 1 each (2 mg total) by mouth as needed (Do not exceed more than 10 pieces in 24 hours). (Patient not taking: Reported on 6/15/2023), Disp: 190 each, Rfl: 0    propranoloL (INDERAL) 10 MG tablet, Take 2 tablets (20 mg total) by mouth 3 (three) times daily., Disp: 180 tablet, Rfl: 11     ROS:                                                                                                                                                                             Review of Systems   Constitutional: Negative.    Respiratory:  Positive for shortness of breath.    Cardiovascular:  Positive for chest pain and palpitations.   Gastrointestinal: Negative.    Musculoskeletal: Negative.    Skin: Negative.    Neurological: Negative.    Psychiatric/Behavioral: Negative.        Blood pressure 105/77, pulse 83, temperature 97.5 °F (36.4 °C), resp. rate 18, height 5' 3.78" (1.62 m), weight 67.1 kg (148 lb), SpO2 97 %.   PE:  Physical Exam  Constitutional:       Appearance: Normal appearance.   HENT:      Head: Normocephalic and atraumatic.   Eyes:      Extraocular Movements: Extraocular movements intact.      Pupils: Pupils are equal, round, and reactive to light.   Cardiovascular:      Rate and Rhythm: Normal rate and regular rhythm.   Pulmonary:      Effort: Pulmonary effort is normal.      Breath sounds: Normal breath sounds.   Abdominal:      Palpations: Abdomen is soft.   Musculoskeletal:         General: Normal range of motion.      Cervical back: Normal range of motion.   Skin:     General: Skin is warm and dry.   Neurological:      General: No focal deficit present.      Mental Status: She is alert and oriented to person, place, and time.   Psychiatric:         Mood and Affect: Mood normal.         Behavior: " Behavior normal.       ASSESSMENT/PLAN:  Chest pain  - Exercise Stress Echocardiogram Oct. 2021 - No wall motion abnormalities seen on stress echocardiography  - Reports 1 episode of transient chest pain since last clinic visit.  Denies any further episodes or  sublingual nitroglycerin.    - Instructed to take SL Nitro prn CP    Palpitations  - 48 Hour Holter monitor- no significant arrhythmias noted (3.30.23)  - reports 1 transient episodes of palpitations since last seen.    - Patient states that she has not taking propranolol in over a year.  Okay to discontinue from a cardiac standpoint considering resolution of palpitations.  However propranolol, likely originally initiated for anxiety.  Patient will confer with PCP to ensure okay to DC from medical standpoint    Diastolic dysfunction per Echo June 2017  - EF of 50-55% per Echo Jan. 2021   - Denies SOB    GERD  - Management per PCP    HLD  - LDL not at goal - 144  - Patient recently started Niacin prescribed by her PCP  - Counseled on low cholesterol diet and exercise to get LDL to goal of < 100    - Patient to complete labs.  Will repeat prior to next clinic visit however, patient reports that she is upcoming appointment with PCP.  Instructed patient to have labs faxed to cardiology clinic if obtained with PCP    Chronic tobacco use  - Reports smokes about 4 cigarettes per day  - Counseled on the importance of smoking cessation   - Patient previously referred to smoking cessation program         Follow up in Cardiology Clinic in 6 months with FLP or sooner if needed   Follow up with PCP as directed

## 2023-06-15 ENCOUNTER — OFFICE VISIT (OUTPATIENT)
Dept: GYNECOLOGY | Facility: CLINIC | Age: 58
End: 2023-06-15
Payer: MEDICAID

## 2023-06-15 ENCOUNTER — TELEPHONE (OUTPATIENT)
Dept: SMOKING CESSATION | Facility: CLINIC | Age: 58
End: 2023-06-15
Payer: MEDICAID

## 2023-06-15 ENCOUNTER — OFFICE VISIT (OUTPATIENT)
Dept: CARDIOLOGY | Facility: CLINIC | Age: 58
End: 2023-06-15
Payer: MEDICAID

## 2023-06-15 VITALS
TEMPERATURE: 98 F | OXYGEN SATURATION: 97 % | HEIGHT: 64 IN | BODY MASS INDEX: 25.27 KG/M2 | DIASTOLIC BLOOD PRESSURE: 77 MMHG | WEIGHT: 148 LBS | RESPIRATION RATE: 18 BRPM | SYSTOLIC BLOOD PRESSURE: 105 MMHG | HEART RATE: 83 BPM

## 2023-06-15 VITALS
OXYGEN SATURATION: 100 % | HEIGHT: 63 IN | DIASTOLIC BLOOD PRESSURE: 82 MMHG | RESPIRATION RATE: 16 BRPM | HEART RATE: 70 BPM | WEIGHT: 150 LBS | TEMPERATURE: 98 F | SYSTOLIC BLOOD PRESSURE: 114 MMHG | BODY MASS INDEX: 26.58 KG/M2

## 2023-06-15 DIAGNOSIS — Z12.31 SCREENING MAMMOGRAM FOR BREAST CANCER: ICD-10-CM

## 2023-06-15 DIAGNOSIS — E78.5 HYPERLIPIDEMIA LDL GOAL <100: Primary | ICD-10-CM

## 2023-06-15 DIAGNOSIS — Z01.419 WOMEN'S ANNUAL ROUTINE GYNECOLOGICAL EXAMINATION: Primary | ICD-10-CM

## 2023-06-15 PROCEDURE — 3074F PR MOST RECENT SYSTOLIC BLOOD PRESSURE < 130 MM HG: ICD-10-PCS | Mod: CPTII,,, | Performed by: NURSE PRACTITIONER

## 2023-06-15 PROCEDURE — 3008F PR BODY MASS INDEX (BMI) DOCUMENTED: ICD-10-PCS | Mod: CPTII,,, | Performed by: NURSE PRACTITIONER

## 2023-06-15 PROCEDURE — 1160F PR REVIEW ALL MEDS BY PRESCRIBER/CLIN PHARMACIST DOCUMENTED: ICD-10-PCS | Mod: CPTII,,, | Performed by: NURSE PRACTITIONER

## 2023-06-15 PROCEDURE — 99214 PR OFFICE/OUTPT VISIT, EST, LEVL IV, 30-39 MIN: ICD-10-PCS | Mod: S$PBB,,, | Performed by: NURSE PRACTITIONER

## 2023-06-15 PROCEDURE — 99215 OFFICE O/P EST HI 40 MIN: CPT | Mod: PBBFAC | Performed by: NURSE PRACTITIONER

## 2023-06-15 PROCEDURE — 1160F RVW MEDS BY RX/DR IN RCRD: CPT | Mod: CPTII,,, | Performed by: NURSE PRACTITIONER

## 2023-06-15 PROCEDURE — 1159F PR MEDICATION LIST DOCUMENTED IN MEDICAL RECORD: ICD-10-PCS | Mod: CPTII,,, | Performed by: NURSE PRACTITIONER

## 2023-06-15 PROCEDURE — 99215 OFFICE O/P EST HI 40 MIN: CPT | Mod: PBBFAC,27 | Performed by: NURSE PRACTITIONER

## 2023-06-15 PROCEDURE — 99214 OFFICE O/P EST MOD 30 MIN: CPT | Mod: S$PBB,,, | Performed by: NURSE PRACTITIONER

## 2023-06-15 PROCEDURE — 3008F BODY MASS INDEX DOCD: CPT | Mod: CPTII,,, | Performed by: NURSE PRACTITIONER

## 2023-06-15 PROCEDURE — 3078F PR MOST RECENT DIASTOLIC BLOOD PRESSURE < 80 MM HG: ICD-10-PCS | Mod: CPTII,,, | Performed by: NURSE PRACTITIONER

## 2023-06-15 PROCEDURE — 99396 PR PREVENTIVE VISIT,EST,40-64: ICD-10-PCS | Mod: S$PBB,,, | Performed by: NURSE PRACTITIONER

## 2023-06-15 PROCEDURE — 3079F DIAST BP 80-89 MM HG: CPT | Mod: CPTII,,, | Performed by: NURSE PRACTITIONER

## 2023-06-15 PROCEDURE — 3079F PR MOST RECENT DIASTOLIC BLOOD PRESSURE 80-89 MM HG: ICD-10-PCS | Mod: CPTII,,, | Performed by: NURSE PRACTITIONER

## 2023-06-15 PROCEDURE — 3078F DIAST BP <80 MM HG: CPT | Mod: CPTII,,, | Performed by: NURSE PRACTITIONER

## 2023-06-15 PROCEDURE — 1159F MED LIST DOCD IN RCRD: CPT | Mod: CPTII,,, | Performed by: NURSE PRACTITIONER

## 2023-06-15 PROCEDURE — 99396 PREV VISIT EST AGE 40-64: CPT | Mod: S$PBB,,, | Performed by: NURSE PRACTITIONER

## 2023-06-15 PROCEDURE — 3074F SYST BP LT 130 MM HG: CPT | Mod: CPTII,,, | Performed by: NURSE PRACTITIONER

## 2023-06-15 RX ORDER — NITROGLYCERIN 0.4 MG/1
0.4 TABLET SUBLINGUAL
Qty: 25 TABLET | Refills: 6 | Status: SHIPPED | OUTPATIENT
Start: 2023-06-15 | End: 2024-06-14

## 2023-06-15 NOTE — PROGRESS NOTES
"Patient ID: Rosa Ramsey is a 57 y.o. female.    Chief Complaint: Well Woman      Review of patient's allergies indicates:   Allergen Reactions    Meperidine Nausea And Vomiting           HPI:  The patient is  (SABx2) here for annual gyn exam. Denies history of abnormal pap smear. Reports hx of partial hysterectomy secondary to AUB. Denies vaginal spotting/discharge. Pt has hx of IC. She was established with uro/gyn in the past but that service is no longer here so she is established with urology. Denies dysuria. Denies vaginal discharge/spotting. States is not sexually active.. Pt is smoker and is established with cessation program. Pt had colonoscopy 3/2022. Next due in 5 years per pt. Pt has hx of osteoporosis and is currently on weekly fosamax. This is managed by her new pcp in [place].    Review of Systems:   Negative except for findings in HPI     Objective:   /82   Pulse 70   Temp 98 °F (36.7 °C)   Resp 16   Ht 5' 3" (1.6 m)   Wt 68 kg (150 lb)   SpO2 100%   BMI 26.57 kg/m²    Physical Exam:  GENERAL: Pt is aware and alert and  in no acute distress.  BREASTS: Bilateral-No masses, nipple discharge, skin changes, or tenderness.  ABDOMEN: Soft, non tender.  VULVA:  No lesions or skin changes.  URETHRA: No lesions  BLADDER: No tenderness.  VAGINA: Mucosa atrophic,no discharge; no lesions.  CERVIX:  absent  BIMANUAL EXAM: The uterus is absent. Gibson adnexa reveal no evidence of masses; no fullness   SKIN: Warm and Dry  PSYCHIATRIC: Patient is awake and alert. Mood and affect are normal.    Assessment:   Women's annual routine gynecological examination    Screening mammogram for breast cancer  -     Mammo Digital Screening Bil[facility] w/ [facility]; Future; Expected date: 12/15/2023            1. Women's annual routine gynecological examination    2. Screening mammogram for breast cancer             -pelvic; pap deferred secondary to hyst for benign conditions    Plan:       Follow up in about 1 year " (around 6/15/2024).

## 2023-06-15 NOTE — PATIENT INSTRUCTIONS
Follow up in Cardiology Clinic in 6 months with FLP or sooner if needed   Follow up with PCP as directed

## 2023-06-15 NOTE — TELEPHONE ENCOUNTER
1st attempt left message regarding smoking cessation quit 1 episode.  Voice message includes to name and contact information.

## 2023-06-21 ENCOUNTER — TELEPHONE (OUTPATIENT)
Dept: SMOKING CESSATION | Facility: CLINIC | Age: 58
End: 2023-06-21
Payer: MEDICAID

## 2023-07-27 ENCOUNTER — OFFICE VISIT (OUTPATIENT)
Dept: UROLOGY | Facility: CLINIC | Age: 58
End: 2023-07-27
Payer: MEDICAID

## 2023-07-27 VITALS
BODY MASS INDEX: 26.58 KG/M2 | RESPIRATION RATE: 18 BRPM | DIASTOLIC BLOOD PRESSURE: 73 MMHG | HEIGHT: 63 IN | WEIGHT: 150 LBS | TEMPERATURE: 98 F | HEART RATE: 70 BPM | OXYGEN SATURATION: 97 % | SYSTOLIC BLOOD PRESSURE: 113 MMHG

## 2023-07-27 DIAGNOSIS — N32.81 OVERACTIVE BLADDER: Primary | ICD-10-CM

## 2023-07-27 LAB
BILIRUB SERPL-MCNC: NEGATIVE MG/DL
BLOOD URINE, POC: NORMAL
COLOR, POC UA: NORMAL
GLUCOSE UR QL STRIP: NEGATIVE
KETONES UR QL STRIP: NEGATIVE
LEUKOCYTE ESTERASE URINE, POC: NEGATIVE
NITRITE, POC UA: NEGATIVE
PH, POC UA: 5.5
PROTEIN, POC: NEGATIVE
SPECIFIC GRAVITY, POC UA: 1.02
UROBILINOGEN, POC UA: 0.2

## 2023-07-27 PROCEDURE — 81001 URINALYSIS AUTO W/SCOPE: CPT | Mod: PBBFAC | Performed by: NURSE PRACTITIONER

## 2023-07-27 PROCEDURE — 1159F MED LIST DOCD IN RCRD: CPT | Mod: CPTII,,, | Performed by: NURSE PRACTITIONER

## 2023-07-27 PROCEDURE — 1160F RVW MEDS BY RX/DR IN RCRD: CPT | Mod: CPTII,,, | Performed by: NURSE PRACTITIONER

## 2023-07-27 PROCEDURE — 3074F PR MOST RECENT SYSTOLIC BLOOD PRESSURE < 130 MM HG: ICD-10-PCS | Mod: CPTII,,, | Performed by: NURSE PRACTITIONER

## 2023-07-27 PROCEDURE — 3008F PR BODY MASS INDEX (BMI) DOCUMENTED: ICD-10-PCS | Mod: CPTII,,, | Performed by: NURSE PRACTITIONER

## 2023-07-27 PROCEDURE — 3074F SYST BP LT 130 MM HG: CPT | Mod: CPTII,,, | Performed by: NURSE PRACTITIONER

## 2023-07-27 PROCEDURE — 1160F PR REVIEW ALL MEDS BY PRESCRIBER/CLIN PHARMACIST DOCUMENTED: ICD-10-PCS | Mod: CPTII,,, | Performed by: NURSE PRACTITIONER

## 2023-07-27 PROCEDURE — 3008F BODY MASS INDEX DOCD: CPT | Mod: CPTII,,, | Performed by: NURSE PRACTITIONER

## 2023-07-27 PROCEDURE — 99213 OFFICE O/P EST LOW 20 MIN: CPT | Mod: S$PBB,,, | Performed by: NURSE PRACTITIONER

## 2023-07-27 PROCEDURE — 3078F DIAST BP <80 MM HG: CPT | Mod: CPTII,,, | Performed by: NURSE PRACTITIONER

## 2023-07-27 PROCEDURE — 99213 PR OFFICE/OUTPT VISIT, EST, LEVL III, 20-29 MIN: ICD-10-PCS | Mod: S$PBB,,, | Performed by: NURSE PRACTITIONER

## 2023-07-27 PROCEDURE — 99214 OFFICE O/P EST MOD 30 MIN: CPT | Mod: PBBFAC | Performed by: NURSE PRACTITIONER

## 2023-07-27 PROCEDURE — 1159F PR MEDICATION LIST DOCUMENTED IN MEDICAL RECORD: ICD-10-PCS | Mod: CPTII,,, | Performed by: NURSE PRACTITIONER

## 2023-07-27 PROCEDURE — 3078F PR MOST RECENT DIASTOLIC BLOOD PRESSURE < 80 MM HG: ICD-10-PCS | Mod: CPTII,,, | Performed by: NURSE PRACTITIONER

## 2023-07-27 NOTE — PATIENT INSTRUCTIONS
Bladder Instillation   Why is this procedure done?   Bladder instillation therapy is when a liquid drug is used to coat the inside of your bladder. Your doctor may order this procedure to:  Treat bladder pain or interstitial cystitis  Treat recurrent bladder infections  Prevent bladder stones  What happens before the procedure?   Your doctor will ask about your health history. Talk to the doctor about:  All the drugs you are taking. Be sure to include all prescription, over the counter, vitamins, and herbal supplements. Bring a list of drugs you take with you. Tell the doctor if you have any drug allergy.  If you take a diuretic, ask the doctor if you should take this drug the morning of your treatment.  If you think you may have a bladder infection, if you have had any fevers, or have had any recent blood in your urine  Any surgeries you have had on your bladder, kidneys, urethra, or ureters  If you need to limit fluids or avoid caffeine before the procedure  The doctor may order tests and check your urine for blood or infection.  What happens during the procedure?   The staff will place a thin, flexible tube through your urethra into your bladder. This is a catheter. The catheter will drain any urine from your bladder.  The doctor will place a mixture of fluid and drugs through the catheter into your bladder. The doctor may heat the fluid before giving it to you.  You will hold the fluid and drugs inside your bladder for a time. Talk with your doctor to find out how long you need to hold the drugs in your bladder to help them work. This could be anywhere from a few minutes to a few hours. Ask your doctor if you need to change positions while the fluid is in your bladder.  What happens after the procedure?   You may have to stay in the doctor's office or hospital for a time while the drugs are in your bladder.  The doctor may take the catheter out right away or it may be left in place to help drain out the fluid  and then be taken out afterwards.  If the doctor takes the catheter out, you will sit on the toilet and pass urine so the urine does not splash when you are allowed to get rid of the fluids and drugs from your bladder.  What care is needed at home?   Ask your doctor what you need to do when you go home. Make sure you ask questions if you do not understand everything the doctor says.  Your doctor may ask you to drink extra fluids after your procedure.  What follow-up care is needed?   Your doctor may ask you to come back to the office to check on your progress. Be sure to keep these visits.  You may need to have more bladder instillations. Talk to your doctor about how many treatments you may need.  What problems could happen?   Bladder or belly pain  Passing urine often  Urinary tract or prostate infection  Severe infection throughout the body  Blood in the urine  Burning feeling in bladder  Feeling tired  Fever  Where can I learn more?   American Cancer Society  https://www.cancer.org/cancer/bladder-cancer/treating/intravesical-therapy.html   [place] [employer]  https://www.baus.org.uk/_userfiles/pages/files/Patients/Leaflets/Painful%20bladder.pdf   Last Reviewed Date   2021-03-18  Consumer Information Use and Disclaimer   This information is not specific medical advice and does not replace information you receive from your health care provider. This is only a brief summary of general information. It does NOT include all information about conditions, illnesses, injuries, tests, procedures, treatments, therapies, discharge instructions or life-style choices that may apply to you. You must talk with your health care provider for complete information about your health and treatment options. This information should not be used to decide whether or not to accept your health care providers advice, instructions or recommendations. Only your health care provider has the knowledge and training to  provide advice that is right for you.  Copyright   Copyright © 2021 [employer] and its affiliates and/or licensors. All rights reserved.

## 2023-07-27 NOTE — PROGRESS NOTES
Chief Complaint: No chief complaint on file.      HPI:  Patient is a 56-year-old female here for a 6 month F/U urinary frequency.  Patient seen by OBGYN on 06/14/2022 with complaints of urinary frequency patient placed on Detrol generic 2 mg, Elmiron for IC.  today Patient presents without dysuria, urinary urgency, frequency, incontinence, retention, gross hematuria, patient does have mild nocturia 1-2 times a night but admits to drinking prior to bedtime and throughout the night. Instructed patient to continue Elmiron, patient has decided to continue Detrol 2 mg b.i.d. discussed with patient possibility of bladder instillations with heparin, lidocaine, prednisone, sodium bicarb, weekly x8 weeks patient will think about it.    Allergies:  Review of patient's allergies indicates:   Allergen Reactions    Meperidine Nausea And Vomiting       Medications:  Current Outpatient Medications   Medication Sig Dispense Refill    alendronate (FOSAMAX) 70 MG tablet Take 70 mg by mouth every 7 days.      amitriptyline (ELAVIL) 10 MG tablet Take 10 mg by mouth every evening.      aspirin 81 MG Chew Take 81 mg by mouth once daily.      cholecalciferol, vitamin D3, 1,250 mcg (50,000 unit) capsule       diclofenac sodium (VOLTAREN) 1 % Gel       ENLYTE 1.5 mg iron- 8.73 mg CpID Take 1 capsule by mouth. for thirty days      meloxicam (MOBIC) 15 MG tablet Take 15 mg by mouth once daily.      methocarbamoL (ROBAXIN) 750 MG Tab Take 1,500 mg by mouth 3 (three) times daily.      montelukast (SINGULAIR) 10 mg tablet Take 10 mg by mouth every evening.      niacin (SLO-NIACIN) 500 mg tablet       nitroGLYCERIN (NITROSTAT) 0.4 MG SL tablet Place 1 tablet (0.4 mg total) under the tongue as needed for Chest pain. 25 tablet 6    pentosan polysulfate (ELMIRON) 100 mg Cap Elmiron 100 mg capsule   TAKE 1 CAPSULE BY MOUTH THREE TIMES DAILY FOR 10 DAYS      zolpidem (AMBIEN) 5 MG Tab Take 5 mg by mouth nightly as needed.      nicotine, polacrilex,  (NICORETTE) 2 mg Gum Take 1 each (2 mg total) by mouth as needed (Do not exceed more than 10 pieces in 24 hours). (Patient not taking: Reported on 6/15/2023) 190 each 0    oxybutynin (DITROPAN-XL) 5 MG TR24 oxybutynin chloride ER 5 mg tablet,extended release 24 hr   Take 1 tablet every day by oral route for 30 days.       No current facility-administered medications for this visit.       Review of Systems:  General: No fever, chills, fatigability, or weight loss.  Skin: No rashes, itching, or changes in color or texture of skin.  Chest: Denies ISLAS, cyanosis, wheezing, cough, and sputum production.  Abdomen: Appetite fine. No weight loss. Denies diarrhea, abdominal pain, hematemesis, or blood in stool.  Musculoskeletal: No joint stiffness or swelling. Denies back pain.  : As above.  All other review of systems negative.    PMH:  Past Medical History:   Diagnosis Date    Acid reflux     Arthritis     H/O arthroscopic knee surgery     History of esophagogastroduodenoscopy (EGD)     History of partial hysterectomy     Hx of colonoscopy     Neuropathy     Osteopenia     Osteoporosis     Smoker        PSH:  Past Surgical History:   Procedure Laterality Date    PARTIAL HYSTERECTOMY      right knee surgery         FamHx:  Family History   Problem Relation Age of Onset    Asthma Mother     Hyperlipidemia Mother     Hypertension Mother     Lung cancer Father        SocHx:  Social History     Socioeconomic History    Marital status: Single   Tobacco Use    Smoking status: Every Day     Packs/day: 0.25     Years: 25.00     Pack years: 6.25     Types: Cigarettes     Passive exposure: Past    Smokeless tobacco: Never    Tobacco comments:     3/day   Substance and Sexual Activity    Alcohol use: Not Currently    Drug use: Never    Sexual activity: Not Currently     Birth control/protection: None       Physical Exam:  Vitals:    07/27/23 1238   BP: 113/73   Pulse: 70   Resp: 18   Temp: 98 °F (36.7 °C)     General: A&Ox3, no  apparent distress, no deformities  Neck: No masses, normal thyroid  Lungs: CTA shahrzad, no use of accessory muscles  Heart: RRR, no arrhythmias  Abdomen: Soft, NT, ND, no masses, no hernias, no hepatosplenomegaly  Lymphatic: Neck and groin nodes negative  Skin: The skin is warm and dry. No jaundice.  Ext: No c/c/e.      Urinalysis:  Results for orders placed or performed in visit on 06/14/22   POCT urinalysis, dipstick or tablet reag   Result Value Ref Range    Color, UA Yellow     Spec Grav UA 1.010     pH, UA 5.5     WBC, UA neg     Nitrite, UA neg     Protein, POC neg     Glucose, UA neg     Ketones, UA neg     Urobilinogen, UA 0.2E.U./dl     Bilirubin, POC neg     Blood, UA small    Microscopic urinalysis reveals small RBCs, negative for WBCs nitrites.          Impression:  1. Overactive bladder  - POCT URINE DIPSTICK WITH MICROSCOPE, AUTOMATED  2. Interstitial cystitis    Plan: Instructed patient continue oxybutynin XL 5 mg p.o. daily and Elmiron.  Reinforced patient teaching  on timed voiding every 2-3 hrs, double voiding, avoidance of bladder irritants such as alcohol, citrus foods, chocolate, caffeinated drinks, energy drinks, spicy foods, sugar, caffeine products, sodas. Instructed patient to avoid drinking fluids 1-2 hours prior to bedtime & void immediately before bedtime.  RTC 6 months.  Informed patient on possible treatment of bladder instillations as discussed above.

## 2023-09-01 DIAGNOSIS — N20.2 CALCULUS OF KIDNEY AND URETER: Primary | ICD-10-CM

## 2023-09-05 ENCOUNTER — HOSPITAL ENCOUNTER (OUTPATIENT)
Dept: RADIOLOGY | Facility: HOSPITAL | Age: 58
Discharge: HOME | End: 2023-09-05
Attending: NURSE PRACTITIONER
Payer: MEDICAID

## 2023-09-05 DIAGNOSIS — N20.2 CALCULUS OF KIDNEY AND URETER: ICD-10-CM

## 2023-09-05 PROCEDURE — 74176 CT ABD & PELVIS W/O CONTRAST: CPT | Mod: TC

## 2023-12-19 ENCOUNTER — HOSPITAL ENCOUNTER (OUTPATIENT)
Dept: RADIOLOGY | Facility: HOSPITAL | Age: 58
Discharge: HOME | End: 2023-12-19
Attending: NURSE PRACTITIONER
Payer: MEDICAID

## 2023-12-19 DIAGNOSIS — M54.2 CERVICALGIA: ICD-10-CM

## 2023-12-19 DIAGNOSIS — M54.50 LOW BACK PAIN: ICD-10-CM

## 2023-12-19 PROCEDURE — 72100 X-RAY EXAM L-S SPINE 2/3 VWS: CPT | Mod: TC

## 2023-12-19 PROCEDURE — 72040 X-RAY EXAM NECK SPINE 2-3 VW: CPT | Mod: TC

## 2024-01-09 ENCOUNTER — HOSPITAL ENCOUNTER (OUTPATIENT)
Dept: RADIOLOGY | Facility: HOSPITAL | Age: 59
Discharge: HOME | End: 2024-01-09
Attending: NURSE PRACTITIONER
Payer: MEDICAID

## 2024-01-09 DIAGNOSIS — Z12.31 SCREENING MAMMOGRAM FOR BREAST CANCER: ICD-10-CM

## 2024-01-09 PROCEDURE — 77063 BREAST TOMOSYNTHESIS BI: CPT | Mod: 26,,, | Performed by: STUDENT IN AN ORGANIZED HEALTH CARE EDUCATION/TRAINING PROGRAM

## 2024-01-09 PROCEDURE — 77067 SCR MAMMO BI INCL CAD: CPT | Mod: TC

## 2024-01-09 PROCEDURE — 77067 SCR MAMMO BI INCL CAD: CPT | Mod: 26,,, | Performed by: STUDENT IN AN ORGANIZED HEALTH CARE EDUCATION/TRAINING PROGRAM

## 2024-01-22 ENCOUNTER — HOSPITAL ENCOUNTER (OUTPATIENT)
Dept: RADIOLOGY | Facility: HOSPITAL | Age: 59
Discharge: HOME | End: 2024-01-22
Attending: NURSE PRACTITIONER
Payer: MEDICAID

## 2024-01-22 DIAGNOSIS — R92.8 ABNORMAL MAMMOGRAM: ICD-10-CM

## 2024-01-22 PROCEDURE — 77061 BREAST TOMOSYNTHESIS UNI: CPT | Mod: 26,RT,, | Performed by: STUDENT IN AN ORGANIZED HEALTH CARE EDUCATION/TRAINING PROGRAM

## 2024-01-22 PROCEDURE — 77065 DX MAMMO INCL CAD UNI: CPT | Mod: 26,RT,, | Performed by: STUDENT IN AN ORGANIZED HEALTH CARE EDUCATION/TRAINING PROGRAM

## 2024-01-22 PROCEDURE — 77065 DX MAMMO INCL CAD UNI: CPT | Mod: TC,RT

## 2024-01-23 ENCOUNTER — TELEPHONE (OUTPATIENT)
Dept: GYNECOLOGY | Facility: CLINIC | Age: 59
End: 2024-01-23
Payer: MEDICAID

## 2024-01-23 NOTE — TELEPHONE ENCOUNTER
Please inform pt that her diagnostic mammogram was normal. She can return to routine screening which is once per year.

## 2024-01-23 NOTE — TELEPHONE ENCOUNTER
Informed pt that her diagnostic mammogram was normal and she can return to routine screening which is once per year, per provider note.    Pt verbalized understanding.

## 2024-02-01 ENCOUNTER — OFFICE VISIT (OUTPATIENT)
Dept: UROLOGY | Facility: CLINIC | Age: 59
End: 2024-02-01
Payer: MEDICAID

## 2024-02-01 VITALS
DIASTOLIC BLOOD PRESSURE: 78 MMHG | TEMPERATURE: 98 F | HEIGHT: 63 IN | RESPIRATION RATE: 20 BRPM | SYSTOLIC BLOOD PRESSURE: 110 MMHG | WEIGHT: 146.38 LBS | OXYGEN SATURATION: 98 % | HEART RATE: 86 BPM | BODY MASS INDEX: 25.94 KG/M2

## 2024-02-01 DIAGNOSIS — N32.81 OVERACTIVE BLADDER: Primary | ICD-10-CM

## 2024-02-01 DIAGNOSIS — N32.81 OVERACTIVE BLADDER: ICD-10-CM

## 2024-02-01 LAB
BILIRUB SERPL-MCNC: NORMAL MG/DL
BLOOD URINE, POC: NORMAL
COLOR, POC UA: YELLOW
GLUCOSE UR QL STRIP: NORMAL
KETONES UR QL STRIP: NORMAL
LEUKOCYTE ESTERASE URINE, POC: NORMAL
NITRITE, POC UA: NORMAL
PH, POC UA: 6
PROTEIN, POC: NORMAL
SPECIFIC GRAVITY, POC UA: 1.02
UROBILINOGEN, POC UA: 0.2

## 2024-02-01 PROCEDURE — 3074F SYST BP LT 130 MM HG: CPT | Mod: CPTII,,, | Performed by: NURSE PRACTITIONER

## 2024-02-01 PROCEDURE — 1159F MED LIST DOCD IN RCRD: CPT | Mod: CPTII,,, | Performed by: NURSE PRACTITIONER

## 2024-02-01 PROCEDURE — 3078F DIAST BP <80 MM HG: CPT | Mod: CPTII,,, | Performed by: NURSE PRACTITIONER

## 2024-02-01 PROCEDURE — 3008F BODY MASS INDEX DOCD: CPT | Mod: CPTII,,, | Performed by: NURSE PRACTITIONER

## 2024-02-01 PROCEDURE — 81001 URINALYSIS AUTO W/SCOPE: CPT | Mod: PBBFAC | Performed by: NURSE PRACTITIONER

## 2024-02-01 PROCEDURE — 99213 OFFICE O/P EST LOW 20 MIN: CPT | Mod: S$PBB,,, | Performed by: NURSE PRACTITIONER

## 2024-02-01 PROCEDURE — 99215 OFFICE O/P EST HI 40 MIN: CPT | Mod: PBBFAC | Performed by: NURSE PRACTITIONER

## 2024-02-01 RX ORDER — TOLTERODINE 4 MG/1
4 CAPSULE, EXTENDED RELEASE ORAL DAILY
Qty: 90 CAPSULE | Refills: 3 | Status: SHIPPED | OUTPATIENT
Start: 2024-02-01 | End: 2025-01-31

## 2024-02-01 RX ORDER — PENTOSAN POLYSULFATE SODIUM 100 MG/1
CAPSULE, GELATIN COATED ORAL
Qty: 90 CAPSULE | Refills: 3 | Status: SHIPPED | OUTPATIENT
Start: 2024-02-01 | End: 2024-02-01

## 2024-02-01 RX ORDER — OXYBUTYNIN CHLORIDE 5 MG/1
TABLET, EXTENDED RELEASE ORAL
Qty: 90 TABLET | Refills: 3 | Status: SHIPPED | OUTPATIENT
Start: 2024-02-01 | End: 2024-02-01 | Stop reason: SDUPTHER

## 2024-02-01 RX ORDER — PENTOSAN POLYSULFATE SODIUM 100 MG/1
CAPSULE, GELATIN COATED ORAL
Qty: 90 CAPSULE | Refills: 3 | Status: SHIPPED | OUTPATIENT
Start: 2024-02-01

## 2024-02-01 RX ORDER — PENTOSAN POLYSULFATE SODIUM 100 MG/1
CAPSULE, GELATIN COATED ORAL
Qty: 90 CAPSULE | Refills: 3 | Status: SHIPPED | OUTPATIENT
Start: 2024-02-01 | End: 2024-02-01 | Stop reason: SDUPTHER

## 2024-02-01 RX ORDER — OXYBUTYNIN CHLORIDE 5 MG/1
TABLET, EXTENDED RELEASE ORAL
Qty: 90 TABLET | Refills: 3 | Status: SHIPPED | OUTPATIENT
Start: 2024-02-01 | End: 2024-02-01

## 2024-02-01 NOTE — PROGRESS NOTES
Placed in room. Seen by Maurice Griffin NP. Spoke with patient. Bladder scan at 0 ml. RTC in 6 months.

## 2024-02-01 NOTE — PROGRESS NOTES
Chief Complaint:   Chief Complaint   Patient presents with    OAB       HPI:  Patient is a 56-year-old female here for a 6 month F/U urinary frequency.  Patient seen by OBGYN on 06/14/2022 with complaints of urinary frequency patient placed on Detrol generic 4 mg, Elmiron for IC.  Bladder scan 1 mL.  Today patient presents without any symptoms of dysuria, urinary frequency, urgency, urinary hesitancy, urinary incontinence, nocturia, gross hematuria.    Allergies:  Review of patient's allergies indicates:   Allergen Reactions    Meperidine Nausea And Vomiting       Medications:  Current Outpatient Medications   Medication Sig Dispense Refill    alendronate (FOSAMAX) 70 MG tablet Take 70 mg by mouth every 7 days.      amitriptyline (ELAVIL) 10 MG tablet Take 10 mg by mouth every evening.      aspirin 81 MG Chew Take 81 mg by mouth once daily.      cholecalciferol, vitamin D3, 1,250 mcg (50,000 unit) capsule       diclofenac sodium (VOLTAREN) 1 % Gel       ENLYTE 1.5 mg iron- 8.73 mg CpID Take 1 capsule by mouth. for thirty days      meloxicam (MOBIC) 15 MG tablet Take 15 mg by mouth once daily.      methocarbamoL (ROBAXIN) 750 MG Tab Take 1,500 mg by mouth 3 (three) times daily.      montelukast (SINGULAIR) 10 mg tablet Take 10 mg by mouth every evening.      nitroGLYCERIN (NITROSTAT) 0.4 MG SL tablet Place 1 tablet (0.4 mg total) under the tongue as needed for Chest pain. 25 tablet 6    oxybutynin (DITROPAN-XL) 5 MG TR24 oxybutynin chloride ER 5 mg tablet,extended release 24 hr   Take 1 tablet every day by oral route for 30 days.      pentosan polysulfate (ELMIRON) 100 mg Cap Elmiron 100 mg capsule   TAKE 1 CAPSULE BY MOUTH THREE TIMES DAILY FOR 10 DAYS      zolpidem (AMBIEN) 5 MG Tab Take 5 mg by mouth nightly as needed.      niacin (SLO-NIACIN) 500 mg tablet       nicotine, polacrilex, (NICORETTE) 2 mg Gum Take 1 each (2 mg total) by mouth as needed (Do not exceed more than 10 pieces in 24 hours). (Patient not  taking: Reported on 6/15/2023) 190 each 0     No current facility-administered medications for this visit.       Review of Systems:  General: No fever, chills, fatigability, or weight loss.  Skin: No rashes, itching, or changes in color or texture of skin.  Chest: Denies ISLAS, cyanosis, wheezing, cough, and sputum production.  Abdomen: Appetite fine. No weight loss. Denies diarrhea, abdominal pain, hematemesis, or blood in stool.  Musculoskeletal: No joint stiffness or swelling. Denies back pain.  : As above.  All other review of systems negative.    PMH:  Past Medical History:   Diagnosis Date    Acid reflux     Arthritis     H/O arthroscopic knee surgery     History of esophagogastroduodenoscopy (EGD)     History of partial hysterectomy     Hx of colonoscopy     Neuropathy     Osteopenia     Osteoporosis     Smoker        PSH:  Past Surgical History:   Procedure Laterality Date    PARTIAL HYSTERECTOMY      right knee surgery         FamHx:  Family History   Problem Relation Age of Onset    Asthma Mother     Hyperlipidemia Mother     Hypertension Mother     Lung cancer Father        SocHx:  Social History     Socioeconomic History    Marital status: Single   Tobacco Use    Smoking status: Every Day     Current packs/day: 0.25     Average packs/day: 0.3 packs/day for 25.0 years (6.3 ttl pk-yrs)     Types: Cigarettes     Passive exposure: Past    Smokeless tobacco: Never    Tobacco comments:     3/day   Substance and Sexual Activity    Alcohol use: Not Currently    Drug use: Never    Sexual activity: Not Currently     Birth control/protection: None       Physical Exam:  Vitals:    02/01/24 1230   BP: 110/78   Pulse: 86   Resp: 20   Temp: 98.2 °F (36.8 °C)     General: A&Ox3, no apparent distress, no deformities  Neck: No masses, normal thyroid  Lungs: CTA shahrzad, no use of accessory muscles  Heart: RRR, no arrhythmias  Abdomen: Soft, NT, ND, no masses, no hernias, no hepatosplenomegaly  Lymphatic: Neck and groin nodes  negative  Skin: The skin is warm and dry. No jaundice.  Ext: No c/c/e.      Urinalysis:   Color, UA Yellow   Spec Grav UA 1.020   pH, UA 6.0   WBC, UA neg   Nitrite, UA neg   Protein, POC neg   Glucose, UA neg   Ketones, UA neg   Urobilinogen, UA 0.2   Bilirubin, POC neg   Blood, UA trace-intact   Microscopic urinalysis revealed trace RBCs, negative WBCs nitrites.        Impression:  1. Overactive bladder  - POCT URINE DIPSTICK WITH MICROSCOPE, AUTOMATED      Plan: Instructed patient continue Detrol LA and Elmiron for interstitial cystitis.  RTC 6 months with bladder scan.  Reinforced patient teaching on avoidance of bladder irritants such as alcohol, citrus foods, chocolate, caffeinated drinks, energy drinks, spicy foods, sugar, caffeine products, sodas. Instructed patient to avoid drinking fluids 1-2 hours prior to bedtime & void immediately before bedtime.

## 2024-03-07 DIAGNOSIS — M43.10 SPONDYLOLISTHESIS: Primary | ICD-10-CM

## 2024-03-14 ENCOUNTER — HOSPITAL ENCOUNTER (OUTPATIENT)
Dept: RADIOLOGY | Facility: HOSPITAL | Age: 59
Discharge: HOME | End: 2024-03-14
Attending: NURSE PRACTITIONER
Payer: MEDICAID

## 2024-03-14 DIAGNOSIS — M43.10 SPONDYLOLISTHESIS: ICD-10-CM

## 2024-03-14 PROCEDURE — 72141 MRI NECK SPINE W/O DYE: CPT | Mod: TC

## 2024-06-18 ENCOUNTER — OFFICE VISIT (OUTPATIENT)
Dept: GYNECOLOGY | Facility: CLINIC | Age: 59
End: 2024-06-18
Payer: MEDICAID

## 2024-06-18 VITALS
HEIGHT: 62 IN | RESPIRATION RATE: 18 BRPM | BODY MASS INDEX: 26.94 KG/M2 | WEIGHT: 146.38 LBS | SYSTOLIC BLOOD PRESSURE: 112 MMHG | HEART RATE: 61 BPM | TEMPERATURE: 98 F | DIASTOLIC BLOOD PRESSURE: 73 MMHG | OXYGEN SATURATION: 100 %

## 2024-06-18 DIAGNOSIS — Z01.419 WOMEN'S ANNUAL ROUTINE GYNECOLOGICAL EXAMINATION: Primary | ICD-10-CM

## 2024-06-18 DIAGNOSIS — Z12.31 SCREENING MAMMOGRAM FOR BREAST CANCER: ICD-10-CM

## 2024-06-18 PROCEDURE — 1160F RVW MEDS BY RX/DR IN RCRD: CPT | Mod: CPTII,,, | Performed by: NURSE PRACTITIONER

## 2024-06-18 PROCEDURE — 99215 OFFICE O/P EST HI 40 MIN: CPT | Mod: PBBFAC | Performed by: NURSE PRACTITIONER

## 2024-06-18 PROCEDURE — 3074F SYST BP LT 130 MM HG: CPT | Mod: CPTII,,, | Performed by: NURSE PRACTITIONER

## 2024-06-18 PROCEDURE — 3078F DIAST BP <80 MM HG: CPT | Mod: CPTII,,, | Performed by: NURSE PRACTITIONER

## 2024-06-18 PROCEDURE — 3008F BODY MASS INDEX DOCD: CPT | Mod: CPTII,,, | Performed by: NURSE PRACTITIONER

## 2024-06-18 PROCEDURE — 1159F MED LIST DOCD IN RCRD: CPT | Mod: CPTII,,, | Performed by: NURSE PRACTITIONER

## 2024-06-18 PROCEDURE — 99396 PREV VISIT EST AGE 40-64: CPT | Mod: S$PBB,,, | Performed by: NURSE PRACTITIONER

## 2024-06-18 NOTE — PROGRESS NOTES
"Patient ID: Rosa Ramsey is a 58 y.o. female.    Chief Complaint: Well Woman      Review of patient's allergies indicates:   Allergen Reactions    Meperidine Nausea And Vomiting             HPI:  The patient is  (SABx2) here for annual gyn exam. Denies history of abnormal pap smear. Reports hx of partial hysterectomy secondary to AUB. Denies vaginal spotting/discharge. Pt has hx of IC and is established with urology. Denies dysuria. Denies vaginal discharge/spotting/dryness. States is not sexually active. Pt had colonoscopy 3/2022. Next due in 5 years per pt. Pt has hx of osteoporosis and is currently on weekly fosamax. This is managed by her new pcp in [place]. Pt reports had right sided abdominal pain on  but that has since resolved. Denies n/v/d. Denies weight loss or appetite changes. States was after eating a large meal.     Review of Systems:   Negative except for findings in HPI     Objective:   /73   Pulse 61   Temp 97.9 °F (36.6 °C) (Oral)   Resp 18   Ht 5' 2" (1.575 m)   Wt 66.4 kg (146 lb 6.4 oz)   SpO2 100%   BMI 26.78 kg/m²    Physical Exam:  GENERAL: Pt is aware and alert and  in no acute distress.  BREASTS: Bilateral-No masses, nipple discharge, skin changes, or tenderness.  ABDOMEN: Soft, non tender.no rebound tenderness  VULVA:  No lesions or skin changes.  URETHRA: No lesions  BLADDER: No tenderness.  VAGINA: Mucosa atrophic. no discharge; no lesions.  CERVIX:  absent  BIMANUAL EXAM:  The uterus is absent. Gibson adnexa reveal no evidence of masses; no fullness   SKIN: Warm and Dry  PSYCHIATRIC: Patient is awake and alert. Mood and affect are normal.    Assessment:   Women's annual routine gynecological examination    Screening mammogram for breast cancer  -     Mammo Digital Screening Bilat w/ [facility]; Future; Expected date: 2025            1. Women's annual routine gynecological examination    2. Screening mammogram for breast cancer             -pap not indicated " secondary to hyst for benign conditions  -notify clinic if pain returns otherwise f/u with pcp  Plan:       Follow up in about 1 year (around 6/18/2025).

## 2024-08-09 ENCOUNTER — OFFICE VISIT (OUTPATIENT)
Dept: UROLOGY | Facility: CLINIC | Age: 59
End: 2024-08-09
Payer: MEDICAID

## 2024-08-09 VITALS
WEIGHT: 148 LBS | TEMPERATURE: 98 F | BODY MASS INDEX: 27.23 KG/M2 | HEART RATE: 76 BPM | HEIGHT: 62 IN | SYSTOLIC BLOOD PRESSURE: 133 MMHG | RESPIRATION RATE: 19 BRPM | OXYGEN SATURATION: 100 % | DIASTOLIC BLOOD PRESSURE: 81 MMHG

## 2024-08-09 DIAGNOSIS — N32.81 OVERACTIVE BLADDER: Primary | ICD-10-CM

## 2024-08-09 DIAGNOSIS — R33.9 URINARY RETENTION: ICD-10-CM

## 2024-08-09 DIAGNOSIS — R33.9 INCOMPLETE BLADDER EMPTYING: ICD-10-CM

## 2024-08-09 LAB
BILIRUB SERPL-MCNC: NORMAL MG/DL
BLOOD URINE, POC: NORMAL
COLOR, POC UA: YELLOW
GLUCOSE UR QL STRIP: NORMAL
KETONES UR QL STRIP: NORMAL
LEUKOCYTE ESTERASE URINE, POC: NORMAL
NITRITE, POC UA: NORMAL
PH, POC UA: 5.5
POC RESIDUAL URINE VOLUME: 146 ML (ref 0–100)
PROTEIN, POC: NORMAL
SPECIFIC GRAVITY, POC UA: 1.02
UROBILINOGEN, POC UA: 0.2

## 2024-08-09 PROCEDURE — 99215 OFFICE O/P EST HI 40 MIN: CPT | Mod: PBBFAC | Performed by: NURSE PRACTITIONER

## 2024-08-09 RX ORDER — CYCLOBENZAPRINE HCL 10 MG
10 TABLET ORAL
COMMUNITY
Start: 2024-05-30

## 2024-08-09 RX ORDER — TAMSULOSIN HYDROCHLORIDE 0.4 MG/1
0.4 CAPSULE ORAL DAILY
Qty: 30 CAPSULE | Refills: 11 | Status: SHIPPED | OUTPATIENT
Start: 2024-08-09 | End: 2025-08-09

## 2024-09-20 ENCOUNTER — OFFICE VISIT (OUTPATIENT)
Dept: UROLOGY | Facility: CLINIC | Age: 59
End: 2024-09-20
Payer: MEDICAID

## 2024-09-20 VITALS
RESPIRATION RATE: 18 BRPM | DIASTOLIC BLOOD PRESSURE: 81 MMHG | OXYGEN SATURATION: 99 % | TEMPERATURE: 98 F | SYSTOLIC BLOOD PRESSURE: 115 MMHG | BODY MASS INDEX: 26.87 KG/M2 | HEART RATE: 72 BPM | HEIGHT: 62 IN | WEIGHT: 146 LBS

## 2024-09-20 DIAGNOSIS — N32.81 OVERACTIVE BLADDER: Primary | ICD-10-CM

## 2024-09-20 DIAGNOSIS — R33.9 INCOMPLETE BLADDER EMPTYING: ICD-10-CM

## 2024-09-20 LAB
BILIRUB SERPL-MCNC: NORMAL MG/DL
BLOOD URINE, POC: NORMAL
COLOR, POC UA: YELLOW
GLUCOSE UR QL STRIP: NORMAL
KETONES UR QL STRIP: NORMAL
LEUKOCYTE ESTERASE URINE, POC: NORMAL
NITRITE, POC UA: NORMAL
PH, POC UA: 1.02
POC RESIDUAL URINE VOLUME: 97 ML (ref 0–100)
PROTEIN, POC: NORMAL
SPECIFIC GRAVITY, POC UA: 1.02
UROBILINOGEN, POC UA: 0.2

## 2024-09-20 PROCEDURE — 99215 OFFICE O/P EST HI 40 MIN: CPT | Mod: PBBFAC | Performed by: NURSE PRACTITIONER

## 2024-09-20 NOTE — PROGRESS NOTES
Chief Complaint:   Chief Complaint   Patient presents with    Follow-up       HPI:   Patient is a 56-year-old female here for a 1 month F/U incomplete bladder.    Patient seen by OBGYN on 06/14/2022 with complaints of urinary frequency patient placed on Detrol generic 4 mg, Elmiron for IC.    Bladder scan 97 mL.  Due to patient's last bladder scan discontinue Detrol and started tamsulosin 0.4 mg p.o. daily, however patient did not take her medicine as directed due to possible side effects.  Instructed patient to start Rapaflo 8 mg p.o. daily RTC one-month for re-evaluation.  Allergies:  Review of patient's allergies indicates:   Allergen Reactions    Meperidine Nausea And Vomiting       Medications:  Current Outpatient Medications   Medication Sig Dispense Refill    alendronate (FOSAMAX) 70 MG tablet Take 70 mg by mouth every 7 days.      aspirin 81 MG Chew Take 81 mg by mouth once daily.      cholecalciferol, vitamin D3, 1,250 mcg (50,000 unit) capsule       cyclobenzaprine (FLEXERIL) 10 MG tablet Take 10 mg by mouth as needed for Muscle spasms.      diclofenac sodium (VOLTAREN) 1 % Gel       ENLYTE 1.5 mg iron- 8.73 mg CpID Take 1 capsule by mouth. for thirty days      meloxicam (MOBIC) 15 MG tablet Take 15 mg by mouth once daily.      montelukast (SINGULAIR) 10 mg tablet Take 10 mg by mouth every evening.      niacin (SLO-NIACIN) 500 mg tablet       pentosan polysulfate (ELMIRON) 100 mg Cap TAKE 1 CAPSULE BY MOUTH THREE TIMES A DAY 90 capsule 3    tamsulosin (FLOMAX) 0.4 mg Cap Take 1 capsule (0.4 mg total) by mouth once daily. 30 capsule 11    tolterodine (DETROL LA) 4 MG 24 hr capsule Take 1 capsule (4 mg total) by mouth once daily. 90 capsule 3    amitriptyline (ELAVIL) 10 MG tablet Take 10 mg by mouth every evening. (Patient not taking: Reported on 8/9/2024)      methocarbamoL (ROBAXIN) 750 MG Tab Take 1,500 mg by mouth 3 (three) times daily. (Patient not taking: Reported on 8/9/2024)      nicotine,  polacrilex, (NICORETTE) 2 mg Gum Take 1 each (2 mg total) by mouth as needed (Do not exceed more than 10 pieces in 24 hours). (Patient not taking: Reported on 6/15/2023) 190 each 0    nitroGLYCERIN (NITROSTAT) 0.4 MG SL tablet Place 1 tablet (0.4 mg total) under the tongue as needed for Chest pain. 25 tablet 6    zolpidem (AMBIEN) 5 MG Tab Take 5 mg by mouth nightly as needed. (Patient not taking: Reported on 8/9/2024)       No current facility-administered medications for this visit.       Review of Systems:  General: No fever, chills, fatigability, or weight loss.  Skin: No rashes, itching, or changes in color or texture of skin.  Chest: Denies ISLAS, cyanosis, wheezing, cough, and sputum production.  Abdomen: Appetite fine. No weight loss. Denies diarrhea, abdominal pain, hematemesis, or blood in stool.  Musculoskeletal: No joint stiffness or swelling. Denies back pain.  : As above.  All other review of systems negative.    PMH:  Past Medical History:   Diagnosis Date    Acid reflux     Arthritis     H/O arthroscopic knee surgery     History of esophagogastroduodenoscopy (EGD)     History of partial hysterectomy     Hx of colonoscopy     Neuropathy     Osteopenia     Osteoporosis     Smoker        PSH:  Past Surgical History:   Procedure Laterality Date    FRACTURE SURGERY      HYSTERECTOMY      PARTIAL HYSTERECTOMY      right knee surgery         FamHx:  Family History   Problem Relation Name Age of Onset    Asthma Mother Sun Pacheco     Hyperlipidemia Mother Sun Pacheco     Hypertension Mother Sun Pacheco     Arthritis Mother Sun Pacheco     Lung cancer Father Dallas Pacheco     Cancer Father Dallas Pacheco     Stroke Maternal Grandmother Desirae Daniel     Cancer Sister Cherri Ureña     Miscarriages / Stillbirths Daughter Lennie Ramsey        SocHx:  Social History     Socioeconomic History    Marital status: Single   Tobacco Use    Smoking status: Every Day     Current packs/day: 0.25      Average packs/day: 0.3 packs/day for 25.0 years (6.3 ttl pk-yrs)     Types: Cigarettes     Passive exposure: Current    Smokeless tobacco: Never    Tobacco comments:     3/day   Substance and Sexual Activity    Alcohol use: Yes     Alcohol/week: 1.0 standard drink of alcohol     Types: 1 Cans of beer per week    Drug use: Never    Sexual activity: Not Currently     Partners: Male     Birth control/protection: None       Physical Exam:  Vitals:    09/20/24 1344   BP: 115/81   Pulse: 72   Resp: 18   Temp: 98.2 °F (36.8 °C)     General: A&Ox3, no apparent distress, no deformities  Neck: No masses, normal thyroid  Lungs: CTA shahrzad, no use of accessory muscles  Heart: RRR, no arrhythmias  Abdomen: Soft, NT, ND, no masses, no hernias, no hepatosplenomegaly  Lymphatic: Neck and groin nodes negative  Skin: The skin is warm and dry. No jaundice.  Ext: No c/c/e.    Urinalysis:  Results for orders placed or performed in visit on 08/09/24   POCT URINE DIPSTICK WITH MICROSCOPE, AUTOMATED   Result Value Ref Range    Color, UA Yellow     Spec Grav UA 1.020     pH, UA 5.5     WBC, UA neg     Nitrite, UA neg     Protein, POC neg     Glucose, UA neg     Ketones, UA neg     Urobilinogen, UA 0.2     Bilirubin, POC neg     Blood, UA moderate    Microscopic urinalysis moderate RBCs, negative WBCs, negative nitrites.      Imaging:       Impression:  1. Overactive bladder  - POCT URINE DIPSTICK WITH MICROSCOPE, AUTOMATED  - POCT Bladder Scan      Plan:  Instructed patient to start Rapaflo 8 mg p.o. daily.  Instructed patient on timed voiding every 2-3 hrs, double voiding, avoidance of bladder irritants such as alcohol, citrus foods, chocolate, caffeinated drinks, energy drinks, spicy foods, sugar, caffeine products, sodas. Instructed patient to avoid drinking fluids 1-2 hours prior to bedtime & void immediately before bedtime.   RTC one-month for re-evaluation.  Instructed patient if develops any abnormal urologic symptoms notify clinic to  be re-evaluate treated or during after hours go to emergency room versus urgent here.                           [facility]

## 2024-10-02 DIAGNOSIS — Z12.31 ENCOUNTER FOR SCREENING MAMMOGRAM FOR BREAST CANCER: Primary | ICD-10-CM

## 2024-11-15 ENCOUNTER — OFFICE VISIT (OUTPATIENT)
Dept: UROLOGY | Facility: CLINIC | Age: 59
End: 2024-11-15
Payer: MEDICAID

## 2024-11-15 VITALS
TEMPERATURE: 98 F | BODY MASS INDEX: 26.68 KG/M2 | SYSTOLIC BLOOD PRESSURE: 101 MMHG | OXYGEN SATURATION: 99 % | RESPIRATION RATE: 20 BRPM | HEIGHT: 62 IN | HEART RATE: 95 BPM | WEIGHT: 145 LBS | DIASTOLIC BLOOD PRESSURE: 68 MMHG

## 2024-11-15 DIAGNOSIS — R33.9 INCOMPLETE BLADDER EMPTYING: ICD-10-CM

## 2024-11-15 DIAGNOSIS — N32.81 OVERACTIVE BLADDER: Primary | ICD-10-CM

## 2024-11-15 LAB
BILIRUB SERPL-MCNC: NEGATIVE MG/DL
BLOOD URINE, POC: NORMAL
COLOR, POC UA: YELLOW
GLUCOSE UR QL STRIP: NEGATIVE
KETONES UR QL STRIP: NEGATIVE
LEUKOCYTE ESTERASE URINE, POC: NEGATIVE
NITRITE, POC UA: NEGATIVE
PH, POC UA: 5.5
POC RESIDUAL URINE VOLUME: 1 ML (ref 0–100)
PROTEIN, POC: NEGATIVE
SPECIFIC GRAVITY, POC UA: 1.01
UROBILINOGEN, POC UA: 0.2

## 2024-11-15 PROCEDURE — 99215 OFFICE O/P EST HI 40 MIN: CPT | Mod: PBBFAC | Performed by: NURSE PRACTITIONER

## 2024-11-15 RX ORDER — SILODOSIN 8 MG/1
8 CAPSULE ORAL DAILY
Qty: 90 CAPSULE | Refills: 3 | Status: SHIPPED | OUTPATIENT
Start: 2024-11-15 | End: 2025-11-15

## 2024-11-15 NOTE — PROGRESS NOTES
Placed in room. Seen by Maurice Griffin NP. Spoke with patient. Bladder scan at 1 ml. Obtain CT of abdomen and pelvis. Next available cysto.

## 2024-11-15 NOTE — PROGRESS NOTES
Chief Complaint: No chief complaint on file.      HPI:   Patient is a 56-year-old female here for a 1 month F/U incomplete bladder.    Patient seen by OBGYN on 06/14/2022 with complaints of urinary frequency patient placed on Detrol generic 4 mg, Elmiron for IC.    Bladder scan 97 mL.  Due to patient's last bladder scan discontinue Detrol and started tamsulosin 0.4 mg p.o. daily, however patient did not take her medicine as directed due to possible side effects.  Instructed patient to start Rapaflo 8 mg p.o. daily RTC one-month for re-evaluation.  Today patient presents much improved symptoms of feelings of incomplete bladder emptying when she remembers to take her Rapaflo.  However patient has persistent fluctuation of asymptomatic hematuria will move forward with a CT of the abdomen pelvis with oral contrast and schedule for a cystoscope with Dr. Lawton to rule out cause of microscopic hematuria.  Bladder scan 1 mL.    Allergies:  Review of patient's allergies indicates:   Allergen Reactions    Meperidine Nausea And Vomiting       Medications:  Current Outpatient Medications   Medication Sig Dispense Refill    alendronate (FOSAMAX) 70 MG tablet Take 70 mg by mouth every 7 days.      amitriptyline (ELAVIL) 10 MG tablet Take 10 mg by mouth every evening. (Patient not taking: Reported on 8/9/2024)      aspirin 81 MG Chew Take 81 mg by mouth once daily.      cholecalciferol, vitamin D3, 1,250 mcg (50,000 unit) capsule       cyclobenzaprine (FLEXERIL) 10 MG tablet Take 10 mg by mouth as needed for Muscle spasms.      diclofenac sodium (VOLTAREN) 1 % Gel       ENLYTE 1.5 mg iron- 8.73 mg CpID Take 1 capsule by mouth. for thirty days      meloxicam (MOBIC) 15 MG tablet Take 15 mg by mouth once daily.      methocarbamoL (ROBAXIN) 750 MG Tab Take 1,500 mg by mouth 3 (three) times daily. (Patient not taking: Reported on 8/9/2024)      montelukast (SINGULAIR) 10 mg tablet Take 10 mg by mouth every evening.      niacin  (SLO-NIACIN) 500 mg tablet       nicotine, polacrilex, (NICORETTE) 2 mg Gum Take 1 each (2 mg total) by mouth as needed (Do not exceed more than 10 pieces in 24 hours). (Patient not taking: Reported on 6/15/2023) 190 each 0    nitroGLYCERIN (NITROSTAT) 0.4 MG SL tablet Place 1 tablet (0.4 mg total) under the tongue as needed for Chest pain. 25 tablet 6    pentosan polysulfate (ELMIRON) 100 mg Cap TAKE 1 CAPSULE BY MOUTH THREE TIMES A DAY 90 capsule 3    tamsulosin (FLOMAX) 0.4 mg Cap Take 1 capsule (0.4 mg total) by mouth once daily. 30 capsule 11    tolterodine (DETROL LA) 4 MG 24 hr capsule Take 1 capsule (4 mg total) by mouth once daily. 90 capsule 3    zolpidem (AMBIEN) 5 MG Tab Take 5 mg by mouth nightly as needed. (Patient not taking: Reported on 8/9/2024)       No current facility-administered medications for this visit.       Review of Systems:  General: No fever, chills, fatigability, or weight loss.  Skin: No rashes, itching, or changes in color or texture of skin.  Chest: Denies ISLAS, cyanosis, wheezing, cough, and sputum production.  Abdomen: Appetite fine. No weight loss. Denies diarrhea, abdominal pain, hematemesis, or blood in stool.  Musculoskeletal: No joint stiffness or swelling. Denies back pain.  : As above.  All other review of systems negative.    PMH:  Past Medical History:   Diagnosis Date    Acid reflux     Arthritis     H/O arthroscopic knee surgery     History of esophagogastroduodenoscopy (EGD)     History of partial hysterectomy     Hx of colonoscopy     Neuropathy     Osteopenia     Osteoporosis     Smoker        PSH:  Past Surgical History:   Procedure Laterality Date    FRACTURE SURGERY      HYSTERECTOMY      PARTIAL HYSTERECTOMY      right knee surgery         FamHx:  Family History   Problem Relation Name Age of Onset    Asthma Mother Sun CATALINO Junior     Hyperlipidemia Mother Sun CATALINO Junior     Hypertension Mother Sun CATALINO Junior     Arthritis Mother Moodytrung BAE Junior     Lung  cancer Father Dallas Pacheco     Cancer Father Dallas Pacheco     Stroke Maternal Grandmother Desirae Daniel     Cancer Sister Cherri Ureña     Miscarriages / Stillbirths Daughter Lennie Ramsey        SocHx:  Social History     Socioeconomic History    Marital status: Single   Tobacco Use    Smoking status: Every Day     Current packs/day: 0.25     Average packs/day: 0.3 packs/day for 25.0 years (6.3 ttl pk-yrs)     Types: Cigarettes     Passive exposure: Current    Smokeless tobacco: Never    Tobacco comments:     3/day   Substance and Sexual Activity    Alcohol use: Yes     Alcohol/week: 1.0 standard drink of alcohol     Types: 1 Cans of beer per week    Drug use: Never    Sexual activity: Not Currently     Partners: Male     Birth control/protection: None       Physical Exam:  There were no vitals filed for this visit.  General: A&Ox3, no apparent distress, no deformities  Neck: No masses, normal thyroid  Lungs: CTA shahrzad, no use of accessory muscles  Heart: RRR, no arrhythmias  Abdomen: Soft, NT, ND, no masses, no hernias, no hepatosplenomegaly  Lymphatic: Neck and groin nodes negative  Skin: The skin is warm and dry. No jaundice.  Ext: No c/c/e.      Urinalysis:  omponent 13:42   Color, UA Yellow   Spec Grav UA 1.015   pH, UA 5.5   WBC, UA Negative   Nitrite, UA Negative   Protein, POC Negative   Glucose, UA Negative   Ketones, UA Negative   Urobilinogen, UA 0.2   Bilirubin, POC Negative   Blood, UA Small           Microscopic urinalysis revealed negative WBCs, negative nitrites, RBCs small.  The patient has a fluctuation of trace to moderate RBCs in the past year.  Patient states she has had a cystoscope many years ago therefore I discuss patient to get a CT of the abdomen pelvis and scheduled for a cystoscope with Dr. Lawton to evaluate microscopic hematuria.  Specimen Collected: 11/15/24 13:42 CST Last Resulted: 11/15/24 13:42 CST           Impression:  1. Overactive bladder  - POCT URINE DIPSTICK WITH  MICROSCOPE, AUTOMATED  - POCT Bladder Scan    2. Incomplete bladder emptying  - POCT URINE DIPSTICK WITH MICROSCOPE, AUTOMATED  - POCT Bladder Scan      Plan:  Instructed patient to continue Rapaflo 8 mg p.o. daily.  Instructed patient due to fluctuation of asymptomatic hematuria we will repeat cystoscope and a CT of the abdomen pelvis with oral contrast.  Instructed patient on timed voiding every 2-3 hrs, double voiding, avoidance of bladder irritants such as alcohol, citrus foods, chocolate, caffeinated drinks, energy drinks, spicy foods, sugar, caffeine products, sodas. Instructed patient to avoid drinking fluids 1-2 hours prior to bedtime & void immediately before bedtime.   RTC 6 months for re-evaluation.  Instructed patient if develops any abnormal urologic symptoms notify clinic to be re-evaluate treated or during after hours go to emergency room versus urgent here.                           F

## 2024-12-06 ENCOUNTER — HOSPITAL ENCOUNTER (OUTPATIENT)
Dept: RADIOLOGY | Facility: HOSPITAL | Age: 59
Discharge: HOME | End: 2024-12-06
Attending: NURSE PRACTITIONER
Payer: MEDICAID

## 2024-12-06 DIAGNOSIS — N32.81 OVERACTIVE BLADDER: ICD-10-CM

## 2024-12-06 LAB
CREAT SERPL-MCNC: 0.86 MG/DL (ref 0.55–1.02)
GFR SERPLBLD CREATININE-BSD FMLA CKD-EPI: >60 ML/MIN/1.73/M2

## 2024-12-06 PROCEDURE — 82565 ASSAY OF CREATININE: CPT | Performed by: NURSE PRACTITIONER

## 2024-12-06 PROCEDURE — 74178 CT ABD&PLV WO CNTR FLWD CNTR: CPT | Mod: TC

## 2024-12-06 PROCEDURE — 25500020 PHARM REV CODE 255

## 2024-12-06 RX ADMIN — IOHEXOL 100 ML: 350 INJECTION, SOLUTION INTRAVENOUS at 09:12

## 2025-02-21 ENCOUNTER — HOSPITAL ENCOUNTER (OUTPATIENT)
Dept: RADIOLOGY | Facility: HOSPITAL | Age: 60
Discharge: HOME | End: 2025-02-21
Attending: NURSE PRACTITIONER
Payer: COMMERCIAL

## 2025-02-21 DIAGNOSIS — Z12.31 ENCOUNTER FOR SCREENING MAMMOGRAM FOR BREAST CANCER: ICD-10-CM

## 2025-02-21 PROCEDURE — 77063 BREAST TOMOSYNTHESIS BI: CPT | Mod: 26,,, | Performed by: STUDENT IN AN ORGANIZED HEALTH CARE EDUCATION/TRAINING PROGRAM

## 2025-02-21 PROCEDURE — 77067 SCR MAMMO BI INCL CAD: CPT | Mod: TC

## 2025-02-21 PROCEDURE — 77067 SCR MAMMO BI INCL CAD: CPT | Mod: 26,,, | Performed by: STUDENT IN AN ORGANIZED HEALTH CARE EDUCATION/TRAINING PROGRAM

## 2025-03-07 ENCOUNTER — TELEPHONE (OUTPATIENT)
Dept: GYNECOLOGY | Facility: CLINIC | Age: 60
End: 2025-03-07
Payer: COMMERCIAL

## 2025-03-07 NOTE — TELEPHONE ENCOUNTER
"Called pt and she stated she received a letter stating that her mammogram is due. Pt recently had a mammogram on 2/26/25. Pt stated "per the letter it stated my breast are dense and it cannot rule out any masses. I am worried and I would like to know if any further testing is needed to give me a peace of mind."    Please advise        Andrew Jimenez Staff  The patient above called to get her mammo orders put in, because she received a letter that it is time for her to get it done. Please advise, Thanks  "

## 2025-03-10 ENCOUNTER — PROCEDURE VISIT (OUTPATIENT)
Dept: UROLOGY | Facility: CLINIC | Age: 60
End: 2025-03-10
Payer: COMMERCIAL

## 2025-03-10 VITALS
BODY MASS INDEX: 25.87 KG/M2 | WEIGHT: 146 LBS | HEIGHT: 63 IN | RESPIRATION RATE: 20 BRPM | SYSTOLIC BLOOD PRESSURE: 101 MMHG | OXYGEN SATURATION: 99 % | DIASTOLIC BLOOD PRESSURE: 68 MMHG | HEART RATE: 95 BPM | TEMPERATURE: 99 F

## 2025-03-10 DIAGNOSIS — R33.9 INCOMPLETE BLADDER EMPTYING: Primary | ICD-10-CM

## 2025-03-10 LAB
BILIRUB SERPL-MCNC: NEGATIVE MG/DL
BLOOD URINE, POC: NEGATIVE
COLOR, POC UA: YELLOW
GLUCOSE UR QL STRIP: NEGATIVE
KETONES UR QL STRIP: NEGATIVE
LEUKOCYTE ESTERASE URINE, POC: NEGATIVE
NITRITE, POC UA: NEGATIVE
PH, POC UA: 7
PROTEIN, POC: NEGATIVE
SPECIFIC GRAVITY, POC UA: 1.01
UROBILINOGEN, POC UA: 0.2

## 2025-03-10 PROCEDURE — 52000 CYSTOURETHROSCOPY: CPT | Mod: PBBFAC | Performed by: UROLOGY

## 2025-03-10 PROCEDURE — 81001 URINALYSIS AUTO W/SCOPE: CPT | Mod: PBBFAC | Performed by: UROLOGY

## 2025-03-10 PROCEDURE — 52000 CYSTOURETHROSCOPY: CPT | Mod: S$PBB,,, | Performed by: UROLOGY

## 2025-03-10 RX ORDER — CIPROFLOXACIN 500 MG/1
500 TABLET ORAL
Status: COMPLETED | OUTPATIENT
Start: 2025-03-10 | End: 2025-03-10

## 2025-03-10 RX ORDER — LIDOCAINE HYDROCHLORIDE 20 MG/ML
JELLY TOPICAL
Status: COMPLETED | OUTPATIENT
Start: 2025-03-10 | End: 2025-03-10

## 2025-03-10 RX ADMIN — LIDOCAINE HYDROCHLORIDE: 20 JELLY TOPICAL at 02:03

## 2025-03-10 RX ADMIN — CIPROFLOXACIN 500 MG: 500 TABLET ORAL at 02:03

## 2025-03-10 NOTE — PROCEDURES
CC:  Cystoscopy    HPI:  Rosa Ramsey is a 59 y.o. female here for cystoscopy for feeling of incomplete bladder emptying  She is on Rapaflo for feeling of incomplete bladder emptying.  She has been doing well on this and currently is asymptomatic as long as she stays on the medication.    Urinalysis:  Results for orders placed or performed in visit on 03/10/25   POCT URINE DIPSTICK WITH MICROSCOPE, AUTOMATED   Result Value Ref Range    Color, UA Yellow     Spec Grav UA 1.015     pH, UA 7.0     WBC, UA Negative     Nitrite, UA Negative     Protein, POC Negative     Glucose, UA Negative     Ketones, UA Negative     Urobilinogen, UA 0.2     Bilirubin, POC Negative     Blood, UA Negative        Microscopic Urinalysis:  WBC:   None per HPF     RBC:    None per HPF     Bacteria:    None per HPF     Squamous epithelial cells:  None per HPF  Crystals:   None    Imaging:   CT - 6 December 2024:  Negative    ROS:  All systems reviewed and are negative except as documented in HPI and/or Assessment and Plan.     Patient Active Problem List:     Problem List[1]     Past Medical History:  Past Medical History:   Diagnosis Date    Acid reflux     Arthritis     H/O arthroscopic knee surgery     History of esophagogastroduodenoscopy (EGD)     History of partial hysterectomy     Hx of colonoscopy     Neuropathy     Osteopenia     Osteoporosis     Smoker         Past Surgical History:  Past Surgical History:   Procedure Laterality Date    FRACTURE SURGERY      HYSTERECTOMY      PARTIAL HYSTERECTOMY      right knee surgery          Family History:  Family History   Problem Relation Name Age of Onset    Asthma Mother Sun Pacheco     Hyperlipidemia Mother Sun Pacheco     Hypertension Mother Sun Pacheco     Arthritis Mother Sun Pacheco     Lung cancer Father Dallas Pacheco     Cancer Father Dallas Pacheco     Stroke Maternal Grandmother Desirae [place]     Cancer Sister Cherri Ureña     Miscarriages / Stillbirths  Daughter Lennie Ramsey         Social History:  Social History     Socioeconomic History    Marital status: Single   Tobacco Use    Smoking status: Every Day     Current packs/day: 0.25     Average packs/day: 0.3 packs/day for 25.0 years (6.3 ttl pk-yrs)     Types: Cigarettes     Passive exposure: Current    Smokeless tobacco: Never    Tobacco comments:     4 cigs per day   Substance and Sexual Activity    Alcohol use: Yes     Alcohol/week: 1.0 standard drink of alcohol     Types: 1 Cans of beer per week    Drug use: Never    Sexual activity: Not Currently     Partners: Male     Birth control/protection: None        Allergies:  Review of patient's allergies indicates:   Allergen Reactions    Meperidine Nausea And Vomiting        Objective:  Vitals:    03/10/25 1356   BP: 101/68   Pulse: 95   Resp: 20   Temp: 98.7 °F (37.1 °C)     General:  Well developed, well nourished adult female in no acute distress  Abdomen: Soft, nontender, no masses  Extremities:  No clubbing, cyanosis, or edema  Neurologic:  Grossly intact  Musculoskeletal:  Normal tone  :  External genitalia is normal without lesions.  Vagina is normal.      Cystoscopy:         - Urethral meatus:  No strictures        - Urethra:  Normal without strictures or lesions        - Bladder neck:  Normal        - Bladder:  No mucosal abnormalities        - Ureteral orifices:  On the trigone with clear efflux bilaterally    The patient tolerated the procedure well without complications.  She was given Cipro 500mg, one tablet in the clinic.   The urethra was anesthetized with 2% Lidocaine Jelly, Urojet.      Assessment:  1. Incomplete bladder emptying  - POCT URINE DIPSTICK WITH MICROSCOPE, AUTOMATED     Plan:  Cystoscopy is negative for any obstructive process.  Continue Rapaflo.    Return appointment:  Three months with bladder scan.               [1]   Patient Active Problem List  Diagnosis    Other chest pain    Hyperlipidemia LDL goal <100    Tobacco use     Interstitial cystitis    Overactive bladder    ISLAS (dyspnea on exertion)    Palpitations    Incomplete bladder emptying

## 2025-03-10 NOTE — PROGRESS NOTES
PT TREATMENT     06/19/21 0945   Note Type   Note Type Treatment   Pain Assessment   Pain Assessment Tool Pain Assessment not indicated - pt denies pain   Restrictions/Precautions   Other Precautions Chair Alarm; Bed Alarm; Fall Risk   General   Chart Reviewed Yes   Family/Caregiver Present No   Cognition   Arousal/Participation Cooperative   Subjective   Subjective patient without pain, frustrated with endurance limitations and weakness   Transfers   Sit to Stand 4  Minimal assistance   Additional items Assist x 1;Verbal cues   Stand to Sit 4  Minimal assistance   Additional items Assist x 1;Verbal cues   Additional Comments patient fatiqued with sit to and from stand x 2, requesting need to sit   Ambulation/Elevation   Gait Assistance 3  Moderate assist   Additional items Assist x 1;Verbal cues; Tactile cues   Assistive Device Rolling walker   Distance 2 - 3 steps with shortened stride length and need to return to sit for "rest" as per patient  /50 with standing activity   Exercises   Heelslides Sitting;10 reps;Bilateral   Glute Sets Sitting;10 reps   Hip Flexion Sitting;10 reps;Bilateral   Hip Abduction Sitting;10 reps;Bilateral   Knee AROM Long Arc Quad Sitting;10 reps;Bilateral   Ankle Pumps Sitting;10 reps;Bilateral  (heel/toe raises completed 10 reps each sitting)   Assessment   Assessment Patient cooperative and motivated  Fatigued with limited activity and required rest periods with activity  Patient will benefit from continued PT with progression as tolerated  The patient's AM-PAC Basic Mobility Inpatient Short Form Raw Score is 13, Standardized Score is 33 99  A standardized score less than 42 9 suggests the patient may benefit from discharge to post-acute rehabilitation services  Please also refer to the recommendation of the Physical Therapist for safe discharge planning  Plan   Treatment/Interventions ADL retraining;Functional transfer training;LE strengthening/ROM; Therapeutic Pt seen by Dr. Tatum; Pt consented & premedicated for procedure; Time out documentation completed; Cystoscopy performed w/no complications; Pt instructed to return to clinic in 3 months w/CHRISTINA Griffin; Discharge paperwork given w/pt verbalizing understanding       exercise; Endurance training;Patient/family training;Equipment eval/education; Bed mobility;Gait training; Compensatory technique education   PT Frequency 5x/wk   Recommendation   PT Discharge Recommendation Post acute rehabilitation services   AM-PAC Basic Mobility Inpatient   Turning in Bed Without Bedrails 3   Lying on Back to Sitting on Edge of Flat Bed 3   Moving Bed to Chair 2   Standing Up From Chair 3   Walk in Room 1   Climb 3-5 Stairs 1   Basic Mobility Inpatient Raw Score 13   Basic Mobility Standardized Score 79 38   Licensure   [place] License Number  Bruno Ratel PT [id number]

## 2025-06-04 ENCOUNTER — OFFICE VISIT (OUTPATIENT)
Dept: UROLOGY | Facility: CLINIC | Age: 60
End: 2025-06-04
Payer: COMMERCIAL

## 2025-06-04 VITALS
SYSTOLIC BLOOD PRESSURE: 108 MMHG | BODY MASS INDEX: 25.58 KG/M2 | OXYGEN SATURATION: 98 % | HEART RATE: 73 BPM | HEIGHT: 63 IN | TEMPERATURE: 98 F | DIASTOLIC BLOOD PRESSURE: 74 MMHG | WEIGHT: 144.38 LBS

## 2025-06-04 DIAGNOSIS — R35.0 URINARY FREQUENCY: ICD-10-CM

## 2025-06-04 DIAGNOSIS — R31.9 HEMATURIA, UNSPECIFIED TYPE: ICD-10-CM

## 2025-06-04 DIAGNOSIS — R33.9 INCOMPLETE BLADDER EMPTYING: Primary | ICD-10-CM

## 2025-06-04 DIAGNOSIS — N32.81 OVERACTIVE BLADDER: ICD-10-CM

## 2025-06-04 DIAGNOSIS — N30.10 INTERSTITIAL CYSTITIS: ICD-10-CM

## 2025-06-04 LAB
BILIRUB SERPL-MCNC: NEGATIVE MG/DL
BLOOD URINE, POC: NORMAL
COLOR, POC UA: YELLOW
ESTROGEN SERPL-MCNC: NORMAL PG/ML
GLUCOSE UR QL STRIP: NEGATIVE
INSULIN SERPL-ACNC: NORMAL U[IU]/ML
KETONES UR QL STRIP: NEGATIVE
LAB AP GROSS DESCRIPTION: NORMAL
LAB AP URINE CYTOLOGY INTERPRETATION SPECIMEN 1: NORMAL
LEUKOCYTE ESTERASE URINE, POC: NEGATIVE
NITRITE, POC UA: NEGATIVE
PH, POC UA: 6
POC RESIDUAL URINE VOLUME: 66 ML (ref 0–100)
PROTEIN, POC: NEGATIVE
SPECIFIC GRAVITY, POC UA: 1.02
UROBILINOGEN, POC UA: 0.2

## 2025-06-04 PROCEDURE — 3008F BODY MASS INDEX DOCD: CPT | Mod: CPTII,,, | Performed by: NURSE PRACTITIONER

## 2025-06-04 PROCEDURE — 51798 US URINE CAPACITY MEASURE: CPT | Mod: PBBFAC | Performed by: NURSE PRACTITIONER

## 2025-06-04 PROCEDURE — 3074F SYST BP LT 130 MM HG: CPT | Mod: CPTII,,, | Performed by: NURSE PRACTITIONER

## 2025-06-04 PROCEDURE — 99213 OFFICE O/P EST LOW 20 MIN: CPT | Mod: S$PBB,,, | Performed by: NURSE PRACTITIONER

## 2025-06-04 PROCEDURE — 81001 URINALYSIS AUTO W/SCOPE: CPT | Mod: PBBFAC | Performed by: NURSE PRACTITIONER

## 2025-06-04 PROCEDURE — 1159F MED LIST DOCD IN RCRD: CPT | Mod: CPTII,,, | Performed by: NURSE PRACTITIONER

## 2025-06-04 PROCEDURE — 3078F DIAST BP <80 MM HG: CPT | Mod: CPTII,,, | Performed by: NURSE PRACTITIONER

## 2025-06-04 PROCEDURE — 99215 OFFICE O/P EST HI 40 MIN: CPT | Mod: PBBFAC | Performed by: NURSE PRACTITIONER

## 2025-06-04 PROCEDURE — 88108 CYTOPATH CONCENTRATE TECH: CPT | Mod: TC | Performed by: NURSE PRACTITIONER

## 2025-06-04 PROCEDURE — 1160F RVW MEDS BY RX/DR IN RCRD: CPT | Mod: CPTII,,, | Performed by: NURSE PRACTITIONER

## 2025-06-04 RX ORDER — MIRABEGRON 50 MG/1
50 TABLET, FILM COATED, EXTENDED RELEASE ORAL DAILY
Qty: 90 TABLET | Refills: 3 | Status: SHIPPED | OUTPATIENT
Start: 2025-06-04 | End: 2026-06-04

## 2025-06-04 RX ORDER — MIRABEGRON 50 MG/1
50 TABLET, FILM COATED, EXTENDED RELEASE ORAL DAILY
Qty: 90 TABLET | Refills: 3 | Status: SHIPPED | OUTPATIENT
Start: 2025-06-04 | End: 2025-06-04 | Stop reason: SDUPTHER

## 2025-06-13 ENCOUNTER — CLINICAL SUPPORT (OUTPATIENT)
Dept: UROLOGY | Facility: CLINIC | Age: 60
End: 2025-06-13
Payer: COMMERCIAL

## 2025-06-13 DIAGNOSIS — N30.10 INTERSTITIAL CYSTITIS: Primary | ICD-10-CM

## 2025-06-13 LAB
BILIRUB SERPL-MCNC: NEGATIVE MG/DL
BLOOD URINE, POC: NORMAL
COLOR, POC UA: YELLOW
GLUCOSE UR QL STRIP: NEGATIVE
KETONES UR QL STRIP: NEGATIVE
LEUKOCYTE ESTERASE URINE, POC: NEGATIVE
NITRITE, POC UA: NEGATIVE
PH, POC UA: 6
PROTEIN, POC: NEGATIVE
SPECIFIC GRAVITY, POC UA: 1.01
UROBILINOGEN, POC UA: 0.2

## 2025-06-13 PROCEDURE — 99212 OFFICE O/P EST SF 10 MIN: CPT | Mod: PBBFAC

## 2025-06-13 RX ORDER — DEXAMETHASONE SODIUM PHOSPHATE 10 MG/ML
100 INJECTION INTRAMUSCULAR; INTRAVENOUS
Status: COMPLETED | OUTPATIENT
Start: 2025-06-13 | End: 2025-06-13

## 2025-06-13 RX ORDER — LIDOCAINE HYDROCHLORIDE 20 MG/ML
5 INJECTION, SOLUTION INFILTRATION; PERINEURAL
Status: COMPLETED | OUTPATIENT
Start: 2025-06-13 | End: 2025-06-13

## 2025-06-13 RX ORDER — HEPARIN SODIUM 5000 [USP'U]/ML
40000 INJECTION, SOLUTION INTRAVENOUS; SUBCUTANEOUS
Status: COMPLETED | OUTPATIENT
Start: 2025-06-13 | End: 2025-06-13

## 2025-06-13 RX ORDER — SODIUM BICARBONATE 42 MG/ML
5 INJECTION, SOLUTION INTRAVENOUS
Status: COMPLETED | OUTPATIENT
Start: 2025-06-13 | End: 2025-06-13

## 2025-06-13 RX ADMIN — HEPARIN SODIUM 40000 UNITS: 5000 INJECTION, SOLUTION INTRAVENOUS; SUBCUTANEOUS at 09:06

## 2025-06-13 RX ADMIN — DEXAMETHASONE SODIUM PHOSPHATE 100 MG: 10 INJECTION INTRAMUSCULAR; INTRAVENOUS at 09:06

## 2025-06-13 RX ADMIN — LIDOCAINE HYDROCHLORIDE 5 ML: 20 INJECTION, SOLUTION INFILTRATION; PERINEURAL at 09:06

## 2025-06-13 RX ADMIN — SODIUM BICARBONATE 5 MEQ: 42 INJECTION, SOLUTION INTRAVENOUS at 09:06

## 2025-06-13 NOTE — PATIENT INSTRUCTIONS
Bladder Instillation   Why is this procedure done?   Bladder instillation therapy is when a liquid drug is used to coat the inside of your bladder. Your doctor may order this procedure to:  Treat bladder pain or interstitial cystitis  Treat recurrent bladder infections  Prevent bladder stones  What happens before the procedure?   Your doctor will ask about your health history. Talk to the doctor about:  All the drugs you are taking. Be sure to include all prescription, over the counter, vitamins, and herbal supplements. Bring a list of drugs you take with you. Tell the doctor if you have any drug allergy.  If you take a diuretic, ask the doctor if you should take this drug the morning of your treatment.  If you think you may have a bladder infection, if you have had any fevers, or have had any recent blood in your urine  Any surgeries you have had on your bladder, kidneys, urethra, or ureters  If you need to limit fluids or avoid caffeine before the procedure  The doctor may order tests and check your urine for blood or infection.  What happens during the procedure?   The staff will place a thin, flexible tube through your urethra into your bladder. This is a catheter. The catheter will drain any urine from your bladder.  The doctor will place a mixture of fluid and drugs through the catheter into your bladder. The doctor may heat the fluid before giving it to you.  You will hold the fluid and drugs inside your bladder for a time. Talk with your doctor to find out how long you need to hold the drugs in your bladder to help them work. This could be anywhere from a few minutes to a few hours. Ask your doctor if you need to change positions while the fluid is in your bladder.  What happens after the procedure?   You may have to stay in the doctor's office or hospital for a time while the drugs are in your bladder.  The doctor may take the catheter out right away or it may be left in place to help drain out the fluid  and then be taken out afterwards.  If the doctor takes the catheter out, you will sit on the toilet and pass urine so the urine does not splash when you are allowed to get rid of the fluids and drugs from your bladder.  What care is needed at home?   Ask your doctor what you need to do when you go home. Make sure you ask questions if you do not understand everything the doctor says.  Your doctor may ask you to drink extra fluids after your procedure.  What follow-up care is needed?   Your doctor may ask you to come back to the office to check on your progress. Be sure to keep these visits.  You may need to have more bladder instillations. Talk to your doctor about how many treatments you may need.  What problems could happen?   Bladder or belly pain  Passing urine often  Urinary tract or prostate infection  Severe infection throughout the body  Blood in the urine  Burning feeling in bladder  Feeling tired  Fever  Where can I learn more?   American Cancer Society  https://www.cancer.org/cancer/bladder-cancer/treating/intravesical-therapy.html   [place] [employer]  https://www.baus.org.uk/_userfiles/pages/files/Patients/Leaflets/Painful%20bladder.pdf   Last Reviewed Date   2021-03-18  Consumer Information Use and Disclaimer   This information is not specific medical advice and does not replace information you receive from your health care provider. This is only a brief summary of general information. It does NOT include all information about conditions, illnesses, injuries, tests, procedures, treatments, therapies, discharge instructions or life-style choices that may apply to you. You must talk with your health care provider for complete information about your health and treatment options. This information should not be used to decide whether or not to accept your health care providers advice, instructions or recommendations. Only your health care provider has the knowledge and training to  provide advice that is right for you.  Copyright   Copyright © 2021 [employer] and its affiliates and/or licensors. All rights reserved.

## 2025-06-20 ENCOUNTER — CLINICAL SUPPORT (OUTPATIENT)
Dept: UROLOGY | Facility: CLINIC | Age: 60
End: 2025-06-20
Payer: COMMERCIAL

## 2025-06-20 DIAGNOSIS — N30.10 INTERSTITIAL CYSTITIS: Primary | ICD-10-CM

## 2025-06-20 LAB
BILIRUB SERPL-MCNC: NORMAL MG/DL
BLOOD URINE, POC: NORMAL
COLOR, POC UA: YELLOW
GLUCOSE UR QL STRIP: NORMAL
KETONES UR QL STRIP: NORMAL
LEUKOCYTE ESTERASE URINE, POC: NORMAL
NITRITE, POC UA: NORMAL
PH, POC UA: 6
PROTEIN, POC: NORMAL
SPECIFIC GRAVITY, POC UA: 1.01
UROBILINOGEN, POC UA: 0.2

## 2025-06-20 PROCEDURE — 99212 OFFICE O/P EST SF 10 MIN: CPT | Mod: PBBFAC

## 2025-06-20 RX ORDER — DEXAMETHASONE SODIUM PHOSPHATE 10 MG/ML
100 INJECTION INTRAMUSCULAR; INTRAVENOUS
Status: COMPLETED | OUTPATIENT
Start: 2025-06-20 | End: 2025-06-20

## 2025-06-20 RX ORDER — LIDOCAINE HYDROCHLORIDE 20 MG/ML
5 INJECTION, SOLUTION INFILTRATION; PERINEURAL
Status: COMPLETED | OUTPATIENT
Start: 2025-06-20 | End: 2025-06-20

## 2025-06-20 RX ORDER — HEPARIN SODIUM 5000 [USP'U]/ML
40000 INJECTION, SOLUTION INTRAVENOUS; SUBCUTANEOUS
Status: COMPLETED | OUTPATIENT
Start: 2025-06-20 | End: 2025-06-20

## 2025-06-20 RX ORDER — SODIUM BICARBONATE 42 MG/ML
5 INJECTION, SOLUTION INTRAVENOUS
Status: COMPLETED | OUTPATIENT
Start: 2025-06-20 | End: 2025-06-20

## 2025-06-20 RX ADMIN — DEXAMETHASONE SODIUM PHOSPHATE 100 MG: 10 INJECTION INTRAMUSCULAR; INTRAVENOUS at 10:06

## 2025-06-20 RX ADMIN — HEPARIN SODIUM 40000 UNITS: 5000 INJECTION, SOLUTION INTRAVENOUS; SUBCUTANEOUS at 10:06

## 2025-06-20 RX ADMIN — LIDOCAINE HYDROCHLORIDE 5 ML: 20 INJECTION, SOLUTION INFILTRATION; PERINEURAL at 10:06

## 2025-06-20 RX ADMIN — SODIUM BICARBONATE 5 MEQ: 42 INJECTION, SOLUTION INTRAVENOUS at 10:06

## 2025-06-20 NOTE — PROGRESS NOTES
Pt presented for bladder instillation #2. Verified pt allergies. States she was able to hold last instillation for 2.5 hours. 14 F catheter inserted using aseptic technique, light yellow urine noted and urine sample obtained. Bladder medication instilled, pt tolerated well. RTC in one week for next instillation.   Component  Ref Range & Units (hover) 10:26   Color, UA Yellow   Spec Grav UA 1.010   pH, UA 6.0   WBC, UA neg   Nitrite, UA neg   Protein, POC neg   Glucose, UA neg   Ketones, UA neg   Urobilinogen, UA 0.2   Bilirubin, POC neg   Blood, UA trace-intact      Microscopic urinalysis revealed RBCs trace intact, nitrites negative, WBCs negative.       Specimen Collected: 06/20/25 10:26 CDT Last Resulted: 06/20/25 10:26 CDT

## 2025-06-27 ENCOUNTER — CLINICAL SUPPORT (OUTPATIENT)
Dept: UROLOGY | Facility: CLINIC | Age: 60
End: 2025-06-27
Payer: COMMERCIAL

## 2025-06-27 DIAGNOSIS — N30.10 INTERSTITIAL CYSTITIS: Primary | ICD-10-CM

## 2025-06-27 PROCEDURE — 87086 URINE CULTURE/COLONY COUNT: CPT

## 2025-06-27 PROCEDURE — 99212 OFFICE O/P EST SF 10 MIN: CPT | Mod: PBBFAC

## 2025-06-27 RX ORDER — HEPARIN SODIUM 5000 [USP'U]/ML
40000 INJECTION, SOLUTION INTRAVENOUS; SUBCUTANEOUS
Status: COMPLETED | OUTPATIENT
Start: 2025-06-27 | End: 2025-06-27

## 2025-06-27 RX ORDER — DEXAMETHASONE SODIUM PHOSPHATE 10 MG/ML
100 INJECTION INTRAMUSCULAR; INTRAVENOUS
Status: COMPLETED | OUTPATIENT
Start: 2025-06-27 | End: 2025-06-27

## 2025-06-27 RX ORDER — LIDOCAINE HYDROCHLORIDE 20 MG/ML
5 INJECTION, SOLUTION INFILTRATION; PERINEURAL
Status: COMPLETED | OUTPATIENT
Start: 2025-06-27 | End: 2025-06-27

## 2025-06-27 RX ORDER — SODIUM BICARBONATE 42 MG/ML
5 INJECTION, SOLUTION INTRAVENOUS
Status: COMPLETED | OUTPATIENT
Start: 2025-06-27 | End: 2025-06-27

## 2025-06-27 RX ADMIN — LIDOCAINE HYDROCHLORIDE 5 ML: 20 INJECTION, SOLUTION INFILTRATION; PERINEURAL at 10:06

## 2025-06-27 RX ADMIN — SODIUM BICARBONATE 5 MEQ: 42 INJECTION, SOLUTION INTRAVENOUS at 10:06

## 2025-06-27 RX ADMIN — DEXAMETHASONE SODIUM PHOSPHATE 100 MG: 10 INJECTION INTRAMUSCULAR; INTRAVENOUS at 10:06

## 2025-06-27 RX ADMIN — HEPARIN SODIUM 40000 UNITS: 5000 INJECTION, SOLUTION INTRAVENOUS; SUBCUTANEOUS at 10:06

## 2025-06-27 NOTE — PROGRESS NOTES
"Bladder instillation #2 held for two hrs and "symptoms seem to be increasing  "stated by patient . Bladder instillation #3 administered with sterile technique via 15 Fr. Mack , small amount of clear odorless urine drained from bladder, patient thinks she has UTI culture sent to lab Procedure tolerated well RTC for weekly instillation   "

## 2025-06-28 LAB — BACTERIA UR CULT: NORMAL

## 2025-07-09 ENCOUNTER — CLINICAL SUPPORT (OUTPATIENT)
Dept: UROLOGY | Facility: CLINIC | Age: 60
End: 2025-07-09
Payer: COMMERCIAL

## 2025-07-09 DIAGNOSIS — N30.10 INTERSTITIAL CYSTITIS: Primary | ICD-10-CM

## 2025-07-09 LAB
BILIRUB SERPL-MCNC: NEGATIVE MG/DL
BLOOD URINE, POC: NORMAL
COLOR, POC UA: NORMAL
GLUCOSE UR QL STRIP: NEGATIVE
KETONES UR QL STRIP: NEGATIVE
LEUKOCYTE ESTERASE URINE, POC: NEGATIVE
NITRITE, POC UA: NEGATIVE
PH, POC UA: 6
PROTEIN, POC: NEGATIVE
SPECIFIC GRAVITY, POC UA: 1.01
UROBILINOGEN, POC UA: 0.2

## 2025-07-09 PROCEDURE — 51700 IRRIGATION OF BLADDER: CPT | Mod: PBBFAC | Performed by: NURSE PRACTITIONER

## 2025-07-09 PROCEDURE — 96372 THER/PROPH/DIAG INJ SC/IM: CPT | Mod: PBBFAC

## 2025-07-09 PROCEDURE — 99212 OFFICE O/P EST SF 10 MIN: CPT | Mod: PBBFAC

## 2025-07-09 PROCEDURE — 51700 IRRIGATION OF BLADDER: CPT | Mod: S$PBB,,, | Performed by: NURSE PRACTITIONER

## 2025-07-09 PROCEDURE — 81001 URINALYSIS AUTO W/SCOPE: CPT | Mod: PBBFAC

## 2025-07-09 RX ORDER — SODIUM BICARBONATE 42 MG/ML
5 INJECTION, SOLUTION INTRAVENOUS
Status: COMPLETED | OUTPATIENT
Start: 2025-07-09 | End: 2025-07-09

## 2025-07-09 RX ORDER — HEPARIN SODIUM 5000 [USP'U]/ML
40000 INJECTION, SOLUTION INTRAVENOUS; SUBCUTANEOUS
Status: COMPLETED | OUTPATIENT
Start: 2025-07-09 | End: 2025-07-09

## 2025-07-09 RX ORDER — DEXAMETHASONE SODIUM PHOSPHATE 10 MG/ML
100 INJECTION INTRAMUSCULAR; INTRAVENOUS
Status: COMPLETED | OUTPATIENT
Start: 2025-07-09 | End: 2025-07-09

## 2025-07-09 RX ORDER — LIDOCAINE HYDROCHLORIDE 20 MG/ML
5 INJECTION, SOLUTION INFILTRATION; PERINEURAL
Status: COMPLETED | OUTPATIENT
Start: 2025-07-09 | End: 2025-07-09

## 2025-07-09 RX ADMIN — SODIUM BICARBONATE 5 MEQ: 42 INJECTION, SOLUTION INTRAVENOUS at 09:07

## 2025-07-09 RX ADMIN — LIDOCAINE HYDROCHLORIDE 5 ML: 20 INJECTION, SOLUTION INFILTRATION; PERINEURAL at 09:07

## 2025-07-09 RX ADMIN — HEPARIN SODIUM 40000 UNITS: 5000 INJECTION INTRAVENOUS; SUBCUTANEOUS at 09:07

## 2025-07-09 RX ADMIN — DEXAMETHASONE SODIUM PHOSPHATE 100 MG: 10 INJECTION, SOLUTION INTRAMUSCULAR; INTRAVENOUS at 09:07

## 2025-07-09 NOTE — PROGRESS NOTES
"Bladder instillation #4 held for two hrs and "symptoms seem to be decreasing "stated by patient . Bladder instillation #5 administered with sterile technique via 15 Fr. Mack , small amount of clear odorless urine drained from bladder. Procedure tolerated well RTC for weekly instillation , patient requested for Friday next week instead Wednesday     "

## 2025-07-18 ENCOUNTER — CLINICAL SUPPORT (OUTPATIENT)
Dept: UROLOGY | Facility: CLINIC | Age: 60
End: 2025-07-18
Payer: COMMERCIAL

## 2025-07-18 DIAGNOSIS — N30.10 INTERSTITIAL CYSTITIS: Primary | ICD-10-CM

## 2025-07-18 PROCEDURE — 99212 OFFICE O/P EST SF 10 MIN: CPT | Mod: PBBFAC

## 2025-07-18 RX ORDER — LIDOCAINE HYDROCHLORIDE 20 MG/ML
5 INJECTION, SOLUTION INFILTRATION; PERINEURAL
Status: COMPLETED | OUTPATIENT
Start: 2025-07-18 | End: 2025-07-18

## 2025-07-18 RX ORDER — SODIUM BICARBONATE 42 MG/ML
5 INJECTION, SOLUTION INTRAVENOUS
Status: COMPLETED | OUTPATIENT
Start: 2025-07-18 | End: 2025-07-18

## 2025-07-18 RX ORDER — DEXAMETHASONE SODIUM PHOSPHATE 10 MG/ML
100 INJECTION INTRAMUSCULAR; INTRAVENOUS
Status: COMPLETED | OUTPATIENT
Start: 2025-07-18 | End: 2025-07-18

## 2025-07-18 RX ORDER — HEPARIN SODIUM 5000 [USP'U]/ML
40000 INJECTION, SOLUTION INTRAVENOUS; SUBCUTANEOUS
Status: COMPLETED | OUTPATIENT
Start: 2025-07-18 | End: 2025-07-18

## 2025-07-18 RX ADMIN — SODIUM BICARBONATE 5 MEQ: 42 INJECTION, SOLUTION INTRAVENOUS at 09:07

## 2025-07-18 RX ADMIN — HEPARIN SODIUM 40000 UNITS: 5000 INJECTION, SOLUTION INTRAVENOUS; SUBCUTANEOUS at 09:07

## 2025-07-18 RX ADMIN — LIDOCAINE HYDROCHLORIDE 5 ML: 20 INJECTION, SOLUTION INFILTRATION; PERINEURAL at 09:07

## 2025-07-18 RX ADMIN — DEXAMETHASONE SODIUM PHOSPHATE 100 MG: 10 INJECTION INTRAMUSCULAR; INTRAVENOUS at 09:07

## 2025-07-18 NOTE — PROGRESS NOTES
Presented to clinic without complaints.  States symptoms have improved.  Urethra prepped with betadine.  14FR house catheter inserted using sterile technique with clear yellow urine noted.  Bladder instillation #5 instilled without difficulty.  Tolerated well.  RTC 1 week as scheduled.

## 2025-07-25 ENCOUNTER — CLINICAL SUPPORT (OUTPATIENT)
Dept: UROLOGY | Facility: CLINIC | Age: 60
End: 2025-07-25
Payer: COMMERCIAL

## 2025-07-25 DIAGNOSIS — N30.10 INTERSTITIAL CYSTITIS: Primary | ICD-10-CM

## 2025-07-25 LAB
BILIRUB SERPL-MCNC: NORMAL MG/DL
BLOOD URINE, POC: NORMAL
COLOR, POC UA: YELLOW
GLUCOSE UR QL STRIP: NORMAL
KETONES UR QL STRIP: NORMAL
LEUKOCYTE ESTERASE URINE, POC: NORMAL
NITRITE, POC UA: NORMAL
PH, POC UA: 6
PROTEIN, POC: NORMAL
SPECIFIC GRAVITY, POC UA: <=1.005
UROBILINOGEN, POC UA: 0.2

## 2025-07-25 PROCEDURE — 99212 OFFICE O/P EST SF 10 MIN: CPT | Mod: PBBFAC,25

## 2025-07-25 PROCEDURE — 81001 URINALYSIS AUTO W/SCOPE: CPT | Mod: PBBFAC

## 2025-07-25 PROCEDURE — 51700 IRRIGATION OF BLADDER: CPT | Mod: PBBFAC | Performed by: UROLOGY

## 2025-07-25 PROCEDURE — 96372 THER/PROPH/DIAG INJ SC/IM: CPT | Mod: PBBFAC

## 2025-07-25 PROCEDURE — 51700 IRRIGATION OF BLADDER: CPT | Mod: S$PBB,,, | Performed by: UROLOGY

## 2025-07-25 RX ORDER — DEXAMETHASONE SODIUM PHOSPHATE 10 MG/ML
100 INJECTION INTRAMUSCULAR; INTRAVENOUS
Status: COMPLETED | OUTPATIENT
Start: 2025-07-25 | End: 2025-07-25

## 2025-07-25 RX ORDER — HEPARIN SODIUM 5000 [USP'U]/ML
40000 INJECTION, SOLUTION INTRAVENOUS; SUBCUTANEOUS ONCE
Status: COMPLETED | OUTPATIENT
Start: 2025-07-25 | End: 2025-07-25

## 2025-07-25 RX ORDER — SODIUM BICARBONATE 42 MG/ML
5 INJECTION, SOLUTION INTRAVENOUS ONCE
Status: COMPLETED | OUTPATIENT
Start: 2025-07-25 | End: 2025-07-25

## 2025-07-25 RX ORDER — LIDOCAINE HYDROCHLORIDE 20 MG/ML
5 INJECTION, SOLUTION INFILTRATION; PERINEURAL
Status: COMPLETED | OUTPATIENT
Start: 2025-07-25 | End: 2025-07-25

## 2025-07-25 RX ADMIN — SODIUM BICARBONATE 5 MEQ: 42 INJECTION, SOLUTION INTRAVENOUS at 09:07

## 2025-07-25 RX ADMIN — LIDOCAINE HYDROCHLORIDE 5 ML: 20 INJECTION, SOLUTION INFILTRATION; PERINEURAL at 09:07

## 2025-07-25 RX ADMIN — HEPARIN SODIUM 40000 UNITS: 5000 INJECTION, SOLUTION INTRAVENOUS; SUBCUTANEOUS at 09:07

## 2025-07-25 RX ADMIN — DEXAMETHASONE SODIUM PHOSPHATE 100 MG: 10 INJECTION, SOLUTION INTRAMUSCULAR; INTRAVENOUS at 09:07

## 2025-07-25 NOTE — PROGRESS NOTES
"Bladder instillation #5 held for two hrs and "symptoms seem to be decreasing "stated by patient . Bladder instillation #6 administered with sterile technique via 15 Fr. Mack , small amount of clear odorless urine drained from bladder. Procedure tolerated well RTC for weekly instillation     "

## 2025-08-08 ENCOUNTER — CLINICAL SUPPORT (OUTPATIENT)
Dept: UROLOGY | Facility: CLINIC | Age: 60
End: 2025-08-08
Payer: COMMERCIAL

## 2025-08-08 DIAGNOSIS — N30.10 INTERSTITIAL CYSTITIS: Primary | ICD-10-CM

## 2025-08-08 LAB
BILIRUB SERPL-MCNC: NORMAL MG/DL
BLOOD URINE, POC: NORMAL
COLOR, POC UA: NORMAL
GLUCOSE UR QL STRIP: NORMAL
KETONES UR QL STRIP: NORMAL
LEUKOCYTE ESTERASE URINE, POC: NORMAL
NITRITE, POC UA: NORMAL
PH, POC UA: 6
PROTEIN, POC: NORMAL
SPECIFIC GRAVITY, POC UA: 1.01
UROBILINOGEN, POC UA: 0.2

## 2025-08-08 PROCEDURE — 99212 OFFICE O/P EST SF 10 MIN: CPT | Mod: PBBFAC

## 2025-08-08 RX ORDER — LIDOCAINE HYDROCHLORIDE 20 MG/ML
5 INJECTION, SOLUTION INFILTRATION; PERINEURAL
Status: COMPLETED | OUTPATIENT
Start: 2025-08-08 | End: 2025-08-08

## 2025-08-08 RX ORDER — HEPARIN SODIUM 5000 [USP'U]/ML
40000 INJECTION, SOLUTION INTRAVENOUS; SUBCUTANEOUS
Status: COMPLETED | OUTPATIENT
Start: 2025-08-08 | End: 2025-08-08

## 2025-08-08 RX ORDER — SODIUM BICARBONATE 42 MG/ML
5 INJECTION, SOLUTION INTRAVENOUS
Status: COMPLETED | OUTPATIENT
Start: 2025-08-08 | End: 2025-08-08

## 2025-08-08 RX ORDER — DEXAMETHASONE SODIUM PHOSPHATE 10 MG/ML
100 INJECTION INTRAMUSCULAR; INTRAVENOUS
Status: COMPLETED | OUTPATIENT
Start: 2025-08-08 | End: 2025-08-08

## 2025-08-08 RX ADMIN — HEPARIN SODIUM 40000 UNITS: 5000 INJECTION, SOLUTION INTRAVENOUS; SUBCUTANEOUS at 09:08

## 2025-08-08 RX ADMIN — SODIUM BICARBONATE 5 MEQ: 42 INJECTION, SOLUTION INTRAVENOUS at 09:08

## 2025-08-08 RX ADMIN — LIDOCAINE HYDROCHLORIDE 5 ML: 20 INJECTION, SOLUTION INFILTRATION; PERINEURAL at 09:08

## 2025-08-08 RX ADMIN — DEXAMETHASONE SODIUM PHOSPHATE 100 MG: 10 INJECTION INTRAMUSCULAR; INTRAVENOUS at 09:08

## 2025-08-08 NOTE — PROGRESS NOTES
Bladder Instillation #7 today, doing well no complaints. Urethra prepped with betadine and 14 FR in and out catheter inserted using aseptic technique. Obtained clear urine sample and bladder drained. Bladder medication instilled and tolerated well. Will return in 2 weeks for last instillation per patient request.   Component  Ref Range & Units (hover) 10:01   Color, UA Light Yellow   Spec Grav UA 1.010   pH, UA 6.0   WBC, UA neg   Nitrite, UA neg   Protein, POC neg   Glucose, UA neg   Ketones, UA neg   Urobilinogen, UA 0.2   Bilirubin, POC neg   Blood, UA small      Microscopic urinalysis revealed RBCs small 2-4 per HPF, negative nitrites, negative WBCs.       Specimen Collected: 08/08/25 10:01 CDT Last Resulted: 08/08/25 10:01 CDT